# Patient Record
Sex: MALE | Race: OTHER | HISPANIC OR LATINO
[De-identification: names, ages, dates, MRNs, and addresses within clinical notes are randomized per-mention and may not be internally consistent; named-entity substitution may affect disease eponyms.]

---

## 2017-12-29 ENCOUNTER — TRANSCRIPTION ENCOUNTER (OUTPATIENT)
Age: 32
End: 2017-12-29

## 2018-07-05 ENCOUNTER — TRANSCRIPTION ENCOUNTER (OUTPATIENT)
Age: 33
End: 2018-07-05

## 2018-10-22 ENCOUNTER — APPOINTMENT (OUTPATIENT)
Dept: SURGERY | Facility: CLINIC | Age: 33
End: 2018-10-22
Payer: COMMERCIAL

## 2018-10-22 VITALS
HEART RATE: 73 BPM | RESPIRATION RATE: 15 BRPM | BODY MASS INDEX: 45.1 KG/M2 | TEMPERATURE: 98.7 F | WEIGHT: 315 LBS | HEIGHT: 70 IN | SYSTOLIC BLOOD PRESSURE: 122 MMHG | DIASTOLIC BLOOD PRESSURE: 84 MMHG | OXYGEN SATURATION: 97 %

## 2018-10-22 DIAGNOSIS — S91.209A UNSPECIFIED OPEN WOUND OF UNSPECIFIED TOE(S) WITH DAMAGE TO NAIL, INITIAL ENCOUNTER: ICD-10-CM

## 2018-10-22 DIAGNOSIS — Z82.0 FAMILY HISTORY OF EPILEPSY AND OTHER DISEASES OF THE NERVOUS SYSTEM: ICD-10-CM

## 2018-10-22 DIAGNOSIS — K21.9 GASTRO-ESOPHAGEAL REFLUX DISEASE W/OUT ESOPHAGITIS: ICD-10-CM

## 2018-10-22 PROCEDURE — 99205 OFFICE O/P NEW HI 60 MIN: CPT

## 2018-10-22 RX ORDER — OMEPRAZOLE 40 MG/1
40 CAPSULE, DELAYED RELEASE ORAL
Refills: 0 | Status: ACTIVE | COMMUNITY

## 2019-02-21 ENCOUNTER — OUTPATIENT (OUTPATIENT)
Dept: OUTPATIENT SERVICES | Facility: HOSPITAL | Age: 34
LOS: 1 days | Discharge: HOME | End: 2019-02-21
Payer: COMMERCIAL

## 2019-02-21 DIAGNOSIS — K21.9 GASTRO-ESOPHAGEAL REFLUX DISEASE WITHOUT ESOPHAGITIS: ICD-10-CM

## 2019-02-21 LAB
BASOPHILS # BLD AUTO: 0.08 K/UL — SIGNIFICANT CHANGE UP (ref 0–0.2)
BASOPHILS NFR BLD AUTO: 0.8 % — SIGNIFICANT CHANGE UP (ref 0–2)
EOSINOPHIL # BLD AUTO: 0.56 K/UL — HIGH (ref 0–0.5)
EOSINOPHIL NFR BLD AUTO: 5.4 % — SIGNIFICANT CHANGE UP (ref 0–6)
HCT VFR BLD CALC: 47.5 % — SIGNIFICANT CHANGE UP (ref 39–50)
HGB BLD-MCNC: 16 G/DL — SIGNIFICANT CHANGE UP (ref 13–17)
IMM GRANULOCYTES NFR BLD AUTO: 0.3 % — SIGNIFICANT CHANGE UP (ref 0–1.5)
LYMPHOCYTES # BLD AUTO: 2.74 K/UL — SIGNIFICANT CHANGE UP (ref 1–3.3)
LYMPHOCYTES # BLD AUTO: 26.7 % — SIGNIFICANT CHANGE UP (ref 13–44)
MCHC RBC-ENTMCNC: 29.9 PG — SIGNIFICANT CHANGE UP (ref 27–34)
MCHC RBC-ENTMCNC: 33.7 GM/DL — SIGNIFICANT CHANGE UP (ref 32–36)
MCV RBC AUTO: 88.6 FL — SIGNIFICANT CHANGE UP (ref 80–100)
MONOCYTES # BLD AUTO: 0.81 K/UL — SIGNIFICANT CHANGE UP (ref 0–0.9)
MONOCYTES NFR BLD AUTO: 7.9 % — SIGNIFICANT CHANGE UP (ref 2–14)
NEUTROPHILS # BLD AUTO: 6.06 K/UL — SIGNIFICANT CHANGE UP (ref 1.8–7.4)
NEUTROPHILS NFR BLD AUTO: 58.9 % — SIGNIFICANT CHANGE UP (ref 43–77)
NRBC # BLD: 0 /100 WBCS — SIGNIFICANT CHANGE UP (ref 0–0)
PLATELET # BLD AUTO: 320 K/UL — SIGNIFICANT CHANGE UP (ref 150–400)
RBC # BLD: 5.36 M/UL — SIGNIFICANT CHANGE UP (ref 4.2–5.8)
RBC # FLD: 12.6 % — SIGNIFICANT CHANGE UP (ref 10.3–14.5)
WBC # BLD: 10.28 K/UL — SIGNIFICANT CHANGE UP (ref 3.8–10.5)
WBC # FLD AUTO: 10.28 K/UL — SIGNIFICANT CHANGE UP (ref 3.8–10.5)

## 2019-02-21 PROCEDURE — 93010 ELECTROCARDIOGRAM REPORT: CPT

## 2019-02-26 ENCOUNTER — RESULT REVIEW (OUTPATIENT)
Age: 34
End: 2019-02-26

## 2019-02-26 ENCOUNTER — OUTPATIENT (OUTPATIENT)
Dept: OUTPATIENT SERVICES | Facility: HOSPITAL | Age: 34
LOS: 1 days | Discharge: HOME | End: 2019-02-26
Payer: COMMERCIAL

## 2019-02-26 VITALS
HEART RATE: 88 BPM | TEMPERATURE: 97 F | RESPIRATION RATE: 18 BRPM | SYSTOLIC BLOOD PRESSURE: 120 MMHG | HEIGHT: 70 IN | WEIGHT: 315 LBS | DIASTOLIC BLOOD PRESSURE: 72 MMHG | OXYGEN SATURATION: 98 %

## 2019-02-26 DIAGNOSIS — Z94.7 CORNEAL TRANSPLANT STATUS: Chronic | ICD-10-CM

## 2019-02-26 LAB
ANION GAP SERPL CALC-SCNC: 6 MMOL/L — SIGNIFICANT CHANGE UP (ref 5–17)
BUN SERPL-MCNC: 14 MG/DL — SIGNIFICANT CHANGE UP (ref 7–23)
CALCIUM SERPL-MCNC: 8.9 MG/DL — SIGNIFICANT CHANGE UP (ref 8.5–10.1)
CHLORIDE SERPL-SCNC: 104 MMOL/L — SIGNIFICANT CHANGE UP (ref 96–108)
CO2 SERPL-SCNC: 28 MMOL/L — SIGNIFICANT CHANGE UP (ref 22–31)
CREAT SERPL-MCNC: 0.84 MG/DL — SIGNIFICANT CHANGE UP (ref 0.5–1.3)
GLUCOSE SERPL-MCNC: 87 MG/DL — SIGNIFICANT CHANGE UP (ref 70–99)
POTASSIUM SERPL-MCNC: 4 MMOL/L — SIGNIFICANT CHANGE UP (ref 3.5–5.3)
POTASSIUM SERPL-SCNC: 4 MMOL/L — SIGNIFICANT CHANGE UP (ref 3.5–5.3)
SODIUM SERPL-SCNC: 138 MMOL/L — SIGNIFICANT CHANGE UP (ref 135–145)

## 2019-02-26 PROCEDURE — 88312 SPECIAL STAINS GROUP 1: CPT | Mod: 26

## 2019-02-26 PROCEDURE — 88305 TISSUE EXAM BY PATHOLOGIST: CPT | Mod: 26

## 2019-02-26 RX ORDER — SODIUM CHLORIDE 9 MG/ML
1000 INJECTION INTRAMUSCULAR; INTRAVENOUS; SUBCUTANEOUS
Qty: 0 | Refills: 0 | Status: DISCONTINUED | OUTPATIENT
Start: 2019-02-26 | End: 2019-03-13

## 2019-02-27 LAB — SURGICAL PATHOLOGY FINAL REPORT - CH: SIGNIFICANT CHANGE UP

## 2019-02-28 DIAGNOSIS — G47.33 OBSTRUCTIVE SLEEP APNEA (ADULT) (PEDIATRIC): ICD-10-CM

## 2019-02-28 DIAGNOSIS — Z01.818 ENCOUNTER FOR OTHER PREPROCEDURAL EXAMINATION: ICD-10-CM

## 2019-02-28 DIAGNOSIS — K21.9 GASTRO-ESOPHAGEAL REFLUX DISEASE WITHOUT ESOPHAGITIS: ICD-10-CM

## 2019-02-28 DIAGNOSIS — K29.50 UNSPECIFIED CHRONIC GASTRITIS WITHOUT BLEEDING: ICD-10-CM

## 2019-02-28 DIAGNOSIS — E66.01 MORBID (SEVERE) OBESITY DUE TO EXCESS CALORIES: ICD-10-CM

## 2019-02-28 DIAGNOSIS — K44.9 DIAPHRAGMATIC HERNIA WITHOUT OBSTRUCTION OR GANGRENE: ICD-10-CM

## 2019-04-19 DIAGNOSIS — Z00.00 ENCOUNTER FOR GENERAL ADULT MEDICAL EXAMINATION W/OUT ABNORMAL FINDINGS: ICD-10-CM

## 2019-05-17 PROBLEM — Z00.00 ENCOUNTER FOR PREVENTIVE HEALTH EXAMINATION: Status: ACTIVE | Noted: 2018-10-08

## 2019-05-19 DIAGNOSIS — E53.8 DEFICIENCY OF OTHER SPECIFIED B GROUP VITAMINS: ICD-10-CM

## 2019-05-19 DIAGNOSIS — E55.9 VITAMIN D DEFICIENCY, UNSPECIFIED: ICD-10-CM

## 2019-05-24 ENCOUNTER — APPOINTMENT (OUTPATIENT)
Dept: ULTRASOUND IMAGING | Facility: CLINIC | Age: 34
End: 2019-05-24

## 2019-05-28 PROBLEM — E55.9 VITAMIN D DEFICIENCY: Status: ACTIVE | Noted: 2019-05-28

## 2019-05-28 PROBLEM — E53.8 LOW FOLATE: Status: ACTIVE | Noted: 2019-05-28

## 2019-05-28 PROBLEM — E53.8 LOW SERUM VITAMIN B12: Status: ACTIVE | Noted: 2019-05-28

## 2019-06-28 ENCOUNTER — APPOINTMENT (OUTPATIENT)
Dept: SURGERY | Facility: CLINIC | Age: 34
End: 2019-06-28
Payer: COMMERCIAL

## 2019-06-28 ENCOUNTER — OUTPATIENT (OUTPATIENT)
Dept: OUTPATIENT SERVICES | Facility: HOSPITAL | Age: 34
LOS: 1 days | End: 2019-06-28
Payer: COMMERCIAL

## 2019-06-28 VITALS
OXYGEN SATURATION: 95 % | WEIGHT: 315 LBS | RESPIRATION RATE: 18 BRPM | DIASTOLIC BLOOD PRESSURE: 75 MMHG | HEIGHT: 69 IN | SYSTOLIC BLOOD PRESSURE: 110 MMHG | HEART RATE: 94 BPM | TEMPERATURE: 98 F

## 2019-06-28 DIAGNOSIS — Z94.7 CORNEAL TRANSPLANT STATUS: Chronic | ICD-10-CM

## 2019-06-28 DIAGNOSIS — G47.33 OBSTRUCTIVE SLEEP APNEA (ADULT) (PEDIATRIC): ICD-10-CM

## 2019-06-28 DIAGNOSIS — K21.9 GASTRO-ESOPHAGEAL REFLUX DISEASE WITHOUT ESOPHAGITIS: ICD-10-CM

## 2019-06-28 DIAGNOSIS — E55.9 VITAMIN D DEFICIENCY, UNSPECIFIED: ICD-10-CM

## 2019-06-28 DIAGNOSIS — E66.01 MORBID (SEVERE) OBESITY DUE TO EXCESS CALORIES: ICD-10-CM

## 2019-06-28 DIAGNOSIS — Z29.9 ENCOUNTER FOR PROPHYLACTIC MEASURES, UNSPECIFIED: ICD-10-CM

## 2019-06-28 LAB
ALBUMIN SERPL ELPH-MCNC: 4.6 G/DL — SIGNIFICANT CHANGE UP (ref 3.3–5)
ALP SERPL-CCNC: 78 U/L — SIGNIFICANT CHANGE UP (ref 40–120)
ALT FLD-CCNC: 35 U/L — SIGNIFICANT CHANGE UP (ref 10–45)
ANION GAP SERPL CALC-SCNC: 19 MMOL/L — HIGH (ref 5–17)
AST SERPL-CCNC: 33 U/L — SIGNIFICANT CHANGE UP (ref 10–40)
BILIRUB SERPL-MCNC: 0.7 MG/DL — SIGNIFICANT CHANGE UP (ref 0.2–1.2)
BLD GP AB SCN SERPL QL: NEGATIVE — SIGNIFICANT CHANGE UP
BUN SERPL-MCNC: 20 MG/DL — SIGNIFICANT CHANGE UP (ref 7–23)
CALCIUM SERPL-MCNC: 10 MG/DL — SIGNIFICANT CHANGE UP (ref 8.4–10.5)
CHLORIDE SERPL-SCNC: 96 MMOL/L — SIGNIFICANT CHANGE UP (ref 96–108)
CO2 SERPL-SCNC: 21 MMOL/L — LOW (ref 22–31)
CREAT SERPL-MCNC: 0.74 MG/DL — SIGNIFICANT CHANGE UP (ref 0.5–1.3)
GLUCOSE SERPL-MCNC: 88 MG/DL — SIGNIFICANT CHANGE UP (ref 70–99)
HBA1C BLD-MCNC: 5.5 % — SIGNIFICANT CHANGE UP (ref 4–5.6)
HCT VFR BLD CALC: 49.6 % — SIGNIFICANT CHANGE UP (ref 39–50)
HGB BLD-MCNC: 16.8 G/DL — SIGNIFICANT CHANGE UP (ref 13–17)
MCHC RBC-ENTMCNC: 29.1 PG — SIGNIFICANT CHANGE UP (ref 27–34)
MCHC RBC-ENTMCNC: 33.9 GM/DL — SIGNIFICANT CHANGE UP (ref 32–36)
MCV RBC AUTO: 85.8 FL — SIGNIFICANT CHANGE UP (ref 80–100)
PLATELET # BLD AUTO: 325 K/UL — SIGNIFICANT CHANGE UP (ref 150–400)
POTASSIUM SERPL-MCNC: 3.9 MMOL/L — SIGNIFICANT CHANGE UP (ref 3.5–5.3)
POTASSIUM SERPL-SCNC: 3.9 MMOL/L — SIGNIFICANT CHANGE UP (ref 3.5–5.3)
PROT SERPL-MCNC: 7.8 G/DL — SIGNIFICANT CHANGE UP (ref 6–8.3)
RBC # BLD: 5.78 M/UL — SIGNIFICANT CHANGE UP (ref 4.2–5.8)
RBC # FLD: 12.2 % — SIGNIFICANT CHANGE UP (ref 10.3–14.5)
RH IG SCN BLD-IMP: POSITIVE — SIGNIFICANT CHANGE UP
SODIUM SERPL-SCNC: 136 MMOL/L — SIGNIFICANT CHANGE UP (ref 135–145)
WBC # BLD: 11.19 K/UL — HIGH (ref 3.8–10.5)
WBC # FLD AUTO: 11.19 K/UL — HIGH (ref 3.8–10.5)

## 2019-06-28 PROCEDURE — G0463: CPT

## 2019-06-28 PROCEDURE — 80053 COMPREHEN METABOLIC PANEL: CPT

## 2019-06-28 PROCEDURE — 85027 COMPLETE CBC AUTOMATED: CPT

## 2019-06-28 PROCEDURE — 99215 OFFICE O/P EST HI 40 MIN: CPT

## 2019-06-28 PROCEDURE — 86901 BLOOD TYPING SEROLOGIC RH(D): CPT

## 2019-06-28 PROCEDURE — 83036 HEMOGLOBIN GLYCOSYLATED A1C: CPT

## 2019-06-28 PROCEDURE — 86850 RBC ANTIBODY SCREEN: CPT

## 2019-06-28 PROCEDURE — 86900 BLOOD TYPING SEROLOGIC ABO: CPT

## 2019-06-28 RX ORDER — CHOLECALCIFEROL (VITAMIN D3) 125 MCG
1 CAPSULE ORAL
Qty: 0 | Refills: 0 | DISCHARGE

## 2019-06-28 NOTE — H&P PST ADULT - NSICDXPROBLEM_GEN_ALL_CORE_FT
PROBLEM DIAGNOSES  Problem: Sleep apnea, obstructive  Assessment and Plan: Instructed to bring CPAP on the day of surgery.     Problem: Morbid (severe) obesity due to excess calories  Assessment and Plan: surgical intervention 7/9/19    Problem: Vitamin D deficiency  Assessment and Plan: will hold medications prior to surgery     Problem: Acid reflux  Assessment and Plan: will take medication the night before surgery PROBLEM DIAGNOSES  Problem: Need for prophylactic measure  Assessment and Plan: The Caprini score indicates this patient is at risk for a VTE event (score 3-5).  Most surgical patients in this group would benefit from pharmacologic prophylaxis.  The surgical team will determine the balance between VTE risk and bleeding risk      Problem: Sleep apnea, obstructive  Assessment and Plan: Instructed to bring CPAP on the day of surgery.     Problem: Morbid (severe) obesity due to excess calories  Assessment and Plan: laparoscopic vertical sleeve gastrectomy    Problem: Acid reflux  Assessment and Plan: continue PPI PROBLEM DIAGNOSES  Problem: Need for prophylactic measure  Assessment and Plan: The Caprini score indicates this patient is at risk for a VTE event (score 3-5).  Most surgical patients in this group would benefit from pharmacologic prophylaxis.  The surgical team will determine the balance between VTE risk and bleeding risk      Problem: Sleep apnea, obstructive  Assessment and Plan: Instructed to bring CPAP on the day of surgery.   ALYCIA precaution, OR booking was notified    Problem: Morbid (severe) obesity due to excess calories  Assessment and Plan: laparoscopic vertical sleeve gastrectomy    Problem: Acid reflux  Assessment and Plan: continue PPI

## 2019-06-28 NOTE — H&P PST ADULT - HISTORY OF PRESENT ILLNESS
Add Progress Note... This 32 y/o severely obese male presents with a PMH of  Acid Reflux, asleep apnea with CPAP use at night, Vitamin D deficiency and B/L corneal transplant with c/o  "gastric sleeve surgery" due to excess calorie intake. Pt is here for Pre-admission testing for a scheduled Laparoscopic gastrectomy sleeve  7/9/19. Pt denies having any generalized weakness, fever, and or pain at this time. This  is 32 y/o male  with a PMH of  Acid Reflux, asleep apnea with CPAP , Vitamin D deficiency and B/L corneal transplant , morbid obesity,    c/o unable to loose weight by diet and exercise .  pt  presents today for  Laparoscopic gastrectomy sleeve  7/9/19. Pt denies having any generalized weakness, fever, and or pain at this time. This  is 34 y/o male  with a PMH of  Acid Reflux, asleep apnea with CPAP , Vitamin D deficiency and B/L corneal transplant , morbid obesity,    c/o unable to loose weight by diet and exercise .  pt  presents today for  Laparoscopic sleeve gastrectomy

## 2019-06-28 NOTE — H&P PST ADULT - NSICDXFAMILYHX_GEN_ALL_CORE_FT
FAMILY HISTORY:  Known health problems: none, Mother,Father, brother No pertinent family history in first degree relatives FAMILY HISTORY:  No pertinent family history in first degree relatives

## 2019-06-28 NOTE — H&P PST ADULT - ACTIVITY
can climb two flughts of stairs, can perform heavy yard work, can run short distances. can climb two flights of stairs, can perform heavy yard work, can run short distances., can perform light duty work around the house. can climb two flights of stairs, can perform heavy yard work, can run short distances.

## 2019-06-28 NOTE — H&P PST ADULT - ASSESSMENT
Morbid severe obesity Due to Excess calories. Morbid severe obesity Due to Excess calories.   DARSHANI VTE 2.0 SCORE [CLOT updated 2019]    AGE RELATED RISK FACTORS                                                       MOBILITY RELATED FACTORS  [ ] Age 41-60 years                                            (1 Point)                    [ ] Bed rest                                                        (1 Point)  [ ] Age: 61-74 years                                           (2 Points)                  [ ] Plaster cast                                                   (2 Points)  [ ] Age= 75 years                                              (3 Points)                    [ ] Bed bound for more than 72 hours                 (2 Points)    DISEASE RELATED RISK FACTORS                                               GENDER SPECIFIC FACTORS  [ ] Edema in the lower extremities                       (1 Point)              [ ] Pregnancy                                                     (1 Point)  [ ] Varicose veins                                               (1 Point)                     [ ] Post-partum < 6 weeks                                   (1 Point)             [x ] BMI > 25 Kg/m2                                            (1 Point)                     [ ] Hormonal therapy  or oral contraception          (1 Point)                 [ ] Sepsis (in the previous month)                        (1 Point)               [ ] History of pregnancy complications                 (1 point)  [ ] Pneumonia or serious lung disease                                               [ ] Unexplained or recurrent                     (1 Point)           (in the previous month)                               (1 Point)  [ ] Abnormal pulmonary function test                     (1 Point)                 SURGERY RELATED RISK FACTORS  [ ] Acute myocardial infarction                              (1 Point)               [ ]  Section                                             (1 Point)  [ ] Congestive heart failure (in the previous month)  (1 Point)      [ ] Minor surgery                                                  (1 Point)   [x ] Inflammatory bowel disease                             (1 Point)               [ ] Arthroscopic surgery                                        (2 Points)  [ ] Central venous access                                      (2 Points)                [x ] General surgery lasting more than 45 minutes (2 points)  [ ] Malignancy- Present or previous                   (2 Points)                [ ] Elective arthroplasty                                         (5 points)    [ ] Stroke (in the previous month)                          (5 Points)                                                                                                                                                           HEMATOLOGY RELATED FACTORS                                                 TRAUMA RELATED RISK FACTORS  [ ] Prior episodes of VTE                                     (3 Points)                [ ] Fracture of the hip, pelvis, or leg                       (5 Points)  [ ] Positive family history for VTE                         (3 Points)             [ ] Acute spinal cord injury (in the previous month)  (5 Points)  [ ] Prothrombin 35095 A                                     (3 Points)               [ ] Paralysis  (less than 1 month)                             (5 Points)  [ ] Factor V Leiden                                             (3 Points)                  [ ] Multiple Trauma within 1 month                        (5 Points)  [ ] Lupus anticoagulants                                     (3 Points)                                                           [ ] Anticardiolipin antibodies                               (3 Points)                                                       [ ] High homocysteine in the blood                      (3 Points)                                             [ ] Other congenital or acquired thrombophilia      (3 Points)                                                [ ] Heparin induced thrombocytopenia                  (3 Points)                                     Total Score [      4    ]

## 2019-06-28 NOTE — H&P PST ADULT - NSICDXPASTMEDICALHX_GEN_ALL_CORE_FT
PAST MEDICAL HISTORY:  Acid reflux on medication, dieat controlled    Vitamin D deficiency on medication PAST MEDICAL HISTORY:  Acid reflux on medication, dieat controlled    Sleep apnea, obstructive Currently on CPAP at night    Vitamin D deficiency on medication PAST MEDICAL HISTORY:  Acid reflux on medication, diet controlled    Sleep apnea, obstructive Currently on CPAP at night    Vitamin D deficiency on medication PAST MEDICAL HISTORY:  Acid reflux     Morbid obesity     Sleep apnea, obstructive with CPAP    Vitamin D deficiency on medication

## 2019-07-08 ENCOUNTER — TRANSCRIPTION ENCOUNTER (OUTPATIENT)
Age: 34
End: 2019-07-08

## 2019-07-08 NOTE — PHARMACOTHERAPY INTERVENTION NOTE - COMMENTS
Vertical sleeve gastrectomy scheduled for 7/9/2019.  Patient medication reconciliation completed. Patient currently taking:     Omeprazole 40mg DR cap by mouth daily (~ 2 yrs for acid reflux)  Folic acid 0.4mg by mouth daily  Vitamin B12 1000mcg by mouth daily  Vitamin D2 50,000units by mouth weekly  Vitamin D3 5000units by mouth daily  Allegra as needed  Tylenol as needed    Patient was instructed to use crushed, dissolvable, chewable, or liquid formulations of medications for 1 month. Patient was informed to take daily multivitamins post surgically. Patient reeducated on NSAID avoidance (ibuprofen, ASA, naproxen, aleve) as they increased risk of GI bleeding; may use APAP for mild pain otherwise contact prescriber for consult. Patient was informed on indications and directions for administration for hyoscyamine SL, acetaminophen liquid, ondansetron ODT, and omeprazole DR. Patient was instructed to take the medications as follows:     Open omeprazole cap and mix with apple sauce  Continue vitamins/supplements in chewable/dissolvable forms   Hold weekly vitamin D x 1 week, continue daily 5000units.    Wilbur Beck, PharmD  [phone]

## 2019-07-09 ENCOUNTER — INPATIENT (INPATIENT)
Facility: HOSPITAL | Age: 34
LOS: 0 days | Discharge: HOME | DRG: 621 | End: 2019-07-10
Attending: SURGERY | Admitting: SURGERY
Payer: COMMERCIAL

## 2019-07-09 ENCOUNTER — APPOINTMENT (OUTPATIENT)
Dept: SURGERY | Facility: HOSPITAL | Age: 34
End: 2019-07-09
Payer: COMMERCIAL

## 2019-07-09 ENCOUNTER — RESULT REVIEW (OUTPATIENT)
Age: 34
End: 2019-07-09

## 2019-07-09 VITALS
HEART RATE: 88 BPM | DIASTOLIC BLOOD PRESSURE: 80 MMHG | SYSTOLIC BLOOD PRESSURE: 122 MMHG | WEIGHT: 315 LBS | RESPIRATION RATE: 18 BRPM | OXYGEN SATURATION: 96 % | HEIGHT: 69 IN | TEMPERATURE: 98 F

## 2019-07-09 DIAGNOSIS — E66.01 MORBID (SEVERE) OBESITY DUE TO EXCESS CALORIES: ICD-10-CM

## 2019-07-09 DIAGNOSIS — Z94.7 CORNEAL TRANSPLANT STATUS: Chronic | ICD-10-CM

## 2019-07-09 LAB
ANION GAP SERPL CALC-SCNC: 19 MMOL/L — HIGH (ref 5–17)
BASOPHILS # BLD AUTO: 0 K/UL — SIGNIFICANT CHANGE UP (ref 0–0.2)
BASOPHILS NFR BLD AUTO: 0.1 % — SIGNIFICANT CHANGE UP (ref 0–2)
BUN SERPL-MCNC: 12 MG/DL — SIGNIFICANT CHANGE UP (ref 7–23)
CALCIUM SERPL-MCNC: 9.7 MG/DL — SIGNIFICANT CHANGE UP (ref 8.4–10.5)
CHLORIDE SERPL-SCNC: 95 MMOL/L — LOW (ref 96–108)
CO2 SERPL-SCNC: 21 MMOL/L — LOW (ref 22–31)
CREAT SERPL-MCNC: 0.96 MG/DL — SIGNIFICANT CHANGE UP (ref 0.5–1.3)
EOSINOPHIL # BLD AUTO: 0.1 K/UL — SIGNIFICANT CHANGE UP (ref 0–0.5)
EOSINOPHIL NFR BLD AUTO: 0.3 % — SIGNIFICANT CHANGE UP (ref 0–6)
GLUCOSE BLDC GLUCOMTR-MCNC: 77 MG/DL — SIGNIFICANT CHANGE UP (ref 70–99)
GLUCOSE SERPL-MCNC: 101 MG/DL — HIGH (ref 70–99)
HCT VFR BLD CALC: 48.9 % — SIGNIFICANT CHANGE UP (ref 39–50)
HGB BLD-MCNC: 16.9 G/DL — SIGNIFICANT CHANGE UP (ref 13–17)
LYMPHOCYTES # BLD AUTO: 1.6 K/UL — SIGNIFICANT CHANGE UP (ref 1–3.3)
LYMPHOCYTES # BLD AUTO: 7.5 % — LOW (ref 13–44)
MCHC RBC-ENTMCNC: 30.5 PG — SIGNIFICANT CHANGE UP (ref 27–34)
MCHC RBC-ENTMCNC: 34.6 GM/DL — SIGNIFICANT CHANGE UP (ref 32–36)
MCV RBC AUTO: 88.1 FL — SIGNIFICANT CHANGE UP (ref 80–100)
MONOCYTES # BLD AUTO: 0.8 K/UL — SIGNIFICANT CHANGE UP (ref 0–0.9)
MONOCYTES NFR BLD AUTO: 3.7 % — SIGNIFICANT CHANGE UP (ref 2–14)
NEUTROPHILS # BLD AUTO: 18.7 K/UL — HIGH (ref 1.8–7.4)
NEUTROPHILS NFR BLD AUTO: 88.4 % — HIGH (ref 43–77)
PLATELET # BLD AUTO: 361 K/UL — SIGNIFICANT CHANGE UP (ref 150–400)
POTASSIUM SERPL-MCNC: 4.3 MMOL/L — SIGNIFICANT CHANGE UP (ref 3.5–5.3)
POTASSIUM SERPL-SCNC: 4.3 MMOL/L — SIGNIFICANT CHANGE UP (ref 3.5–5.3)
RBC # BLD: 5.55 M/UL — SIGNIFICANT CHANGE UP (ref 4.2–5.8)
RBC # FLD: 11.7 % — SIGNIFICANT CHANGE UP (ref 10.3–14.5)
RH IG SCN BLD-IMP: POSITIVE — SIGNIFICANT CHANGE UP
SODIUM SERPL-SCNC: 135 MMOL/L — SIGNIFICANT CHANGE UP (ref 135–145)
WBC # BLD: 21.2 K/UL — HIGH (ref 3.8–10.5)
WBC # FLD AUTO: 21.2 K/UL — HIGH (ref 3.8–10.5)

## 2019-07-09 PROCEDURE — 43281 LAP PARAESOPHAG HERN REPAIR: CPT | Mod: 59

## 2019-07-09 PROCEDURE — 43775 LAP SLEEVE GASTRECTOMY: CPT

## 2019-07-09 PROCEDURE — 88305 TISSUE EXAM BY PATHOLOGIST: CPT | Mod: 26

## 2019-07-09 RX ORDER — HYDROMORPHONE HYDROCHLORIDE 2 MG/ML
0.25 INJECTION INTRAMUSCULAR; INTRAVENOUS; SUBCUTANEOUS
Refills: 0 | Status: DISCONTINUED | OUTPATIENT
Start: 2019-07-09 | End: 2019-07-09

## 2019-07-09 RX ORDER — CEFAZOLIN SODIUM 1 G
3000 VIAL (EA) INJECTION EVERY 8 HOURS
Refills: 0 | Status: COMPLETED | OUTPATIENT
Start: 2019-07-09 | End: 2019-07-10

## 2019-07-09 RX ORDER — KETOROLAC TROMETHAMINE 30 MG/ML
30 SYRINGE (ML) INJECTION EVERY 6 HOURS
Refills: 0 | Status: DISCONTINUED | OUTPATIENT
Start: 2019-07-09 | End: 2019-07-10

## 2019-07-09 RX ORDER — ONDANSETRON 8 MG/1
4 TABLET, FILM COATED ORAL ONCE
Refills: 0 | Status: COMPLETED | OUTPATIENT
Start: 2019-07-09 | End: 2019-07-09

## 2019-07-09 RX ORDER — SODIUM CHLORIDE 9 MG/ML
3 INJECTION INTRAMUSCULAR; INTRAVENOUS; SUBCUTANEOUS EVERY 8 HOURS
Refills: 0 | Status: DISCONTINUED | OUTPATIENT
Start: 2019-07-09 | End: 2019-07-09

## 2019-07-09 RX ORDER — ACETAMINOPHEN 500 MG
1000 TABLET ORAL ONCE
Refills: 0 | Status: COMPLETED | OUTPATIENT
Start: 2019-07-09 | End: 2019-07-09

## 2019-07-09 RX ORDER — OXYCODONE HYDROCHLORIDE 5 MG/1
5 TABLET ORAL EVERY 4 HOURS
Refills: 0 | Status: DISCONTINUED | OUTPATIENT
Start: 2019-07-09 | End: 2019-07-10

## 2019-07-09 RX ORDER — LIDOCAINE HCL 20 MG/ML
0.2 VIAL (ML) INJECTION ONCE
Refills: 0 | Status: DISCONTINUED | OUTPATIENT
Start: 2019-07-09 | End: 2019-07-09

## 2019-07-09 RX ORDER — KETOROLAC TROMETHAMINE 30 MG/ML
30 SYRINGE (ML) INJECTION EVERY 6 HOURS
Refills: 0 | Status: DISCONTINUED | OUTPATIENT
Start: 2019-07-09 | End: 2019-07-09

## 2019-07-09 RX ORDER — ONDANSETRON 8 MG/1
4 TABLET, FILM COATED ORAL EVERY 6 HOURS
Refills: 0 | Status: DISCONTINUED | OUTPATIENT
Start: 2019-07-09 | End: 2019-07-10

## 2019-07-09 RX ORDER — CEFAZOLIN SODIUM 1 G
3000 VIAL (EA) INJECTION ONCE
Refills: 0 | Status: COMPLETED | OUTPATIENT
Start: 2019-07-09 | End: 2019-07-09

## 2019-07-09 RX ORDER — FOLIC ACID 0.8 MG
1 TABLET ORAL
Qty: 0 | Refills: 0 | DISCHARGE

## 2019-07-09 RX ORDER — CHLORHEXIDINE GLUCONATE 213 G/1000ML
1 SOLUTION TOPICAL ONCE
Refills: 0 | Status: DISCONTINUED | OUTPATIENT
Start: 2019-07-09 | End: 2019-07-09

## 2019-07-09 RX ORDER — PANTOPRAZOLE SODIUM 20 MG/1
40 TABLET, DELAYED RELEASE ORAL EVERY 24 HOURS
Refills: 0 | Status: DISCONTINUED | OUTPATIENT
Start: 2019-07-09 | End: 2019-07-10

## 2019-07-09 RX ORDER — KETOROLAC TROMETHAMINE 30 MG/ML
30 SYRINGE (ML) INJECTION ONCE
Refills: 0 | Status: DISCONTINUED | OUTPATIENT
Start: 2019-07-09 | End: 2019-07-09

## 2019-07-09 RX ORDER — SODIUM CHLORIDE 9 MG/ML
1000 INJECTION, SOLUTION INTRAVENOUS
Refills: 0 | Status: DISCONTINUED | OUTPATIENT
Start: 2019-07-09 | End: 2019-07-10

## 2019-07-09 RX ORDER — HEPARIN SODIUM 5000 [USP'U]/ML
7500 INJECTION INTRAVENOUS; SUBCUTANEOUS EVERY 8 HOURS
Refills: 0 | Status: DISCONTINUED | OUTPATIENT
Start: 2019-07-09 | End: 2019-07-10

## 2019-07-09 RX ORDER — HEPARIN SODIUM 5000 [USP'U]/ML
7500 INJECTION INTRAVENOUS; SUBCUTANEOUS ONCE
Refills: 0 | Status: COMPLETED | OUTPATIENT
Start: 2019-07-09 | End: 2019-07-09

## 2019-07-09 RX ORDER — CHOLECALCIFEROL (VITAMIN D3) 125 MCG
1 CAPSULE ORAL
Qty: 0 | Refills: 0 | DISCHARGE

## 2019-07-09 RX ORDER — HYOSCYAMINE SULFATE 0.13 MG
0.12 TABLET ORAL EVERY 6 HOURS
Refills: 0 | Status: DISCONTINUED | OUTPATIENT
Start: 2019-07-09 | End: 2019-07-10

## 2019-07-09 RX ORDER — ACETAMINOPHEN 500 MG
1000 TABLET ORAL ONCE
Refills: 0 | Status: DISCONTINUED | OUTPATIENT
Start: 2019-07-09 | End: 2019-07-09

## 2019-07-09 RX ORDER — ACETAMINOPHEN 500 MG
1000 TABLET ORAL ONCE
Refills: 0 | Status: COMPLETED | OUTPATIENT
Start: 2019-07-10 | End: 2019-07-10

## 2019-07-09 RX ORDER — PREGABALIN 225 MG/1
1 CAPSULE ORAL
Qty: 0 | Refills: 0 | DISCHARGE

## 2019-07-09 RX ADMIN — Medication 1000 MILLIGRAM(S): at 17:53

## 2019-07-09 RX ADMIN — Medication 0.12 MILLIGRAM(S): at 11:33

## 2019-07-09 RX ADMIN — Medication 0.12 MILLIGRAM(S): at 23:36

## 2019-07-09 RX ADMIN — HEPARIN SODIUM 7500 UNIT(S): 5000 INJECTION INTRAVENOUS; SUBCUTANEOUS at 20:02

## 2019-07-09 RX ADMIN — Medication 200 MILLIGRAM(S): at 19:54

## 2019-07-09 RX ADMIN — Medication 30 MILLIGRAM(S): at 11:30

## 2019-07-09 RX ADMIN — Medication 400 MILLIGRAM(S): at 17:47

## 2019-07-09 RX ADMIN — Medication 30 MILLIGRAM(S): at 11:45

## 2019-07-09 RX ADMIN — ONDANSETRON 4 MILLIGRAM(S): 8 TABLET, FILM COATED ORAL at 11:32

## 2019-07-09 RX ADMIN — Medication 30 MILLIGRAM(S): at 23:36

## 2019-07-09 RX ADMIN — Medication 30 MILLIGRAM(S): at 17:44

## 2019-07-09 RX ADMIN — HEPARIN SODIUM 7500 UNIT(S): 5000 INJECTION INTRAVENOUS; SUBCUTANEOUS at 07:02

## 2019-07-09 RX ADMIN — Medication 30 MILLIGRAM(S): at 17:54

## 2019-07-09 RX ADMIN — SODIUM CHLORIDE 250 MILLILITER(S): 9 INJECTION, SOLUTION INTRAVENOUS at 12:01

## 2019-07-09 RX ADMIN — ONDANSETRON 4 MILLIGRAM(S): 8 TABLET, FILM COATED ORAL at 23:36

## 2019-07-09 RX ADMIN — Medication 400 MILLIGRAM(S): at 23:35

## 2019-07-09 RX ADMIN — Medication 0.12 MILLIGRAM(S): at 17:46

## 2019-07-09 RX ADMIN — ONDANSETRON 4 MILLIGRAM(S): 8 TABLET, FILM COATED ORAL at 17:54

## 2019-07-09 RX ADMIN — PANTOPRAZOLE SODIUM 40 MILLIGRAM(S): 20 TABLET, DELAYED RELEASE ORAL at 11:32

## 2019-07-09 RX ADMIN — ONDANSETRON 4 MILLIGRAM(S): 8 TABLET, FILM COATED ORAL at 12:41

## 2019-07-09 RX ADMIN — SODIUM CHLORIDE 150 MILLILITER(S): 9 INJECTION, SOLUTION INTRAVENOUS at 17:53

## 2019-07-09 NOTE — BRIEF OPERATIVE NOTE - NSICDXBRIEFPROCEDURE_GEN_ALL_CORE_FT
PROCEDURES:  Gastrectomy, sleeve, laparoscopic, with laparoscopic hiatal hernia repair 09-Jul-2019 11:13:28  Guy Ding

## 2019-07-09 NOTE — CHART NOTE - NSCHARTNOTEFT_GEN_A_CORE
S: Patient doing well, denies fevers, chills, nausea, emesis, chest pain, SOB.  Says he is experiencing some epigastric pain, but is manageable.  He has ambulated.  He has not yet voided nor eaten or drank anything.    O: Vital Signs  T(C): 36.4 (07-09 @ 14:00), Max: 36.8 (07-09 @ 06:51)  HR: 87 (07-09 @ 14:00) (81 - 89)  BP: 122/58 (07-09 @ 14:00) (105/59 - 153/90)  RR: 14 (07-09 @ 14:00) (12 - 18)  SpO2: 98% (07-09 @ 14:00) (94% - 100%)  07-09-19 @ 07:01  -  07-09-19 @ 14:21  --------------------------------------------------------  IN: 650 mL / OUT: 0 mL / NET: 650 mL      General: alert and oriented, NAD  Resp: airway patent, respirations unlabored  CVS: regular rate and rhythm  Abdomen: soft, nontender, nondistended.  Lap site closures clear dry and intact.  Extremities: no edema  Skin: warm, dry, appropriate color                          16.9   21.2  )-----------( 361      ( 09 Jul 2019 11:51 )             48.9   07-09    135  |  95<L>  |  12  ----------------------------<  101<H>  4.3   |  21<L>  |  0.96    Ca    9.7      09 Jul 2019 11:51    Assessment:  Mr. Swartz is a 33 yom with PMH of GERD, Morbid obesity and ALYCIA on CPAP who is now s/p laparoscopic sleeve gastrectomy with hiatal hernia repair.  He is doing well post-operatively without significant pain.    Plan:    Pain control with tylenol, tordol, and oxycodone  Start clears six hours post-operatively  Awaiting return of voiding function

## 2019-07-09 NOTE — PRE-ANESTHESIA EVALUATION ADULT - NSANTHADDINFOFT_GEN_ALL_CORE
ASA 3: class 3 obesity with GERD, ALYCIA, no other sequelae, here for bariatric surgery. Airway exam predicts adequate airway for intubation.

## 2019-07-10 ENCOUNTER — TRANSCRIPTION ENCOUNTER (OUTPATIENT)
Age: 34
End: 2019-07-10

## 2019-07-10 VITALS
DIASTOLIC BLOOD PRESSURE: 76 MMHG | HEART RATE: 74 BPM | OXYGEN SATURATION: 97 % | SYSTOLIC BLOOD PRESSURE: 116 MMHG | RESPIRATION RATE: 18 BRPM | TEMPERATURE: 98 F

## 2019-07-10 PROBLEM — G47.33 OBSTRUCTIVE SLEEP APNEA (ADULT) (PEDIATRIC): Chronic | Status: ACTIVE | Noted: 2019-06-28

## 2019-07-10 PROBLEM — E66.01 MORBID (SEVERE) OBESITY DUE TO EXCESS CALORIES: Chronic | Status: ACTIVE | Noted: 2019-06-28

## 2019-07-10 PROBLEM — K21.9 GASTRO-ESOPHAGEAL REFLUX DISEASE WITHOUT ESOPHAGITIS: Chronic | Status: ACTIVE | Noted: 2019-06-28

## 2019-07-10 PROBLEM — E55.9 VITAMIN D DEFICIENCY, UNSPECIFIED: Chronic | Status: ACTIVE | Noted: 2019-06-28

## 2019-07-10 LAB
ANION GAP SERPL CALC-SCNC: 16 MMOL/L — SIGNIFICANT CHANGE UP (ref 5–17)
BASOPHILS # BLD AUTO: 0 K/UL — SIGNIFICANT CHANGE UP (ref 0–0.2)
BASOPHILS NFR BLD AUTO: 0.3 % — SIGNIFICANT CHANGE UP (ref 0–2)
BUN SERPL-MCNC: 13 MG/DL — SIGNIFICANT CHANGE UP (ref 7–23)
CALCIUM SERPL-MCNC: 8.5 MG/DL — SIGNIFICANT CHANGE UP (ref 8.4–10.5)
CHLORIDE SERPL-SCNC: 99 MMOL/L — SIGNIFICANT CHANGE UP (ref 96–108)
CO2 SERPL-SCNC: 22 MMOL/L — SIGNIFICANT CHANGE UP (ref 22–31)
CREAT SERPL-MCNC: 0.79 MG/DL — SIGNIFICANT CHANGE UP (ref 0.5–1.3)
EOSINOPHIL # BLD AUTO: 0 K/UL — SIGNIFICANT CHANGE UP (ref 0–0.5)
EOSINOPHIL NFR BLD AUTO: 0.2 % — SIGNIFICANT CHANGE UP (ref 0–6)
GLUCOSE SERPL-MCNC: 90 MG/DL — SIGNIFICANT CHANGE UP (ref 70–99)
HCT VFR BLD CALC: 42.3 % — SIGNIFICANT CHANGE UP (ref 39–50)
HGB BLD-MCNC: 14.1 G/DL — SIGNIFICANT CHANGE UP (ref 13–17)
LYMPHOCYTES # BLD AUTO: 16.2 % — SIGNIFICANT CHANGE UP (ref 13–44)
LYMPHOCYTES # BLD AUTO: 2.4 K/UL — SIGNIFICANT CHANGE UP (ref 1–3.3)
MCHC RBC-ENTMCNC: 29.4 PG — SIGNIFICANT CHANGE UP (ref 27–34)
MCHC RBC-ENTMCNC: 33.4 GM/DL — SIGNIFICANT CHANGE UP (ref 32–36)
MCV RBC AUTO: 88 FL — SIGNIFICANT CHANGE UP (ref 80–100)
MONOCYTES # BLD AUTO: 1.8 K/UL — HIGH (ref 0–0.9)
MONOCYTES NFR BLD AUTO: 12.1 % — SIGNIFICANT CHANGE UP (ref 2–14)
NEUTROPHILS # BLD AUTO: 10.5 K/UL — HIGH (ref 1.8–7.4)
NEUTROPHILS NFR BLD AUTO: 71.1 % — SIGNIFICANT CHANGE UP (ref 43–77)
PLATELET # BLD AUTO: 337 K/UL — SIGNIFICANT CHANGE UP (ref 150–400)
POTASSIUM SERPL-MCNC: 3.9 MMOL/L — SIGNIFICANT CHANGE UP (ref 3.5–5.3)
POTASSIUM SERPL-SCNC: 3.9 MMOL/L — SIGNIFICANT CHANGE UP (ref 3.5–5.3)
RBC # BLD: 4.8 M/UL — SIGNIFICANT CHANGE UP (ref 4.2–5.8)
RBC # FLD: 11.6 % — SIGNIFICANT CHANGE UP (ref 10.3–14.5)
SODIUM SERPL-SCNC: 137 MMOL/L — SIGNIFICANT CHANGE UP (ref 135–145)
WBC # BLD: 14.7 K/UL — HIGH (ref 3.8–10.5)
WBC # FLD AUTO: 14.7 K/UL — HIGH (ref 3.8–10.5)

## 2019-07-10 PROCEDURE — 82962 GLUCOSE BLOOD TEST: CPT

## 2019-07-10 PROCEDURE — 86900 BLOOD TYPING SEROLOGIC ABO: CPT

## 2019-07-10 PROCEDURE — 86901 BLOOD TYPING SEROLOGIC RH(D): CPT

## 2019-07-10 PROCEDURE — 80048 BASIC METABOLIC PNL TOTAL CA: CPT

## 2019-07-10 PROCEDURE — 88305 TISSUE EXAM BY PATHOLOGIST: CPT

## 2019-07-10 PROCEDURE — 85027 COMPLETE CBC AUTOMATED: CPT

## 2019-07-10 PROCEDURE — C1889: CPT

## 2019-07-10 RX ORDER — ERGOCALCIFEROL 1.25 MG/1
1.25 CAPSULE ORAL
Qty: 0 | Refills: 0 | DISCHARGE

## 2019-07-10 RX ORDER — OXYCODONE HYDROCHLORIDE 5 MG/1
5 TABLET ORAL
Qty: 200 | Refills: 0
Start: 2019-07-10 | End: 2019-07-16

## 2019-07-10 RX ORDER — HYOSCYAMINE SULFATE 0.13 MG
1 TABLET ORAL
Qty: 15 | Refills: 0
Start: 2019-07-10 | End: 2019-07-16

## 2019-07-10 RX ORDER — ONDANSETRON 8 MG/1
1 TABLET, FILM COATED ORAL
Qty: 21 | Refills: 0
Start: 2019-07-10 | End: 2019-07-16

## 2019-07-10 RX ORDER — OMEPRAZOLE 10 MG/1
1 CAPSULE, DELAYED RELEASE ORAL
Qty: 0 | Refills: 0 | DISCHARGE

## 2019-07-10 RX ORDER — ACETAMINOPHEN 500 MG
500 TABLET ORAL
Qty: 0 | Refills: 0 | DISCHARGE

## 2019-07-10 RX ORDER — ACETAMINOPHEN 500 MG
650 TABLET ORAL EVERY 6 HOURS
Refills: 0 | Status: DISCONTINUED | OUTPATIENT
Start: 2019-07-10 | End: 2019-07-10

## 2019-07-10 RX ORDER — HYOSCYAMINE SULFATE 0.13 MG
0 TABLET ORAL
Qty: 0 | Refills: 0 | DISCHARGE
Start: 2019-07-10

## 2019-07-10 RX ORDER — OMEPRAZOLE 10 MG/1
1 CAPSULE, DELAYED RELEASE ORAL
Qty: 30 | Refills: 0
Start: 2019-07-10 | End: 2019-08-08

## 2019-07-10 RX ADMIN — ONDANSETRON 4 MILLIGRAM(S): 8 TABLET, FILM COATED ORAL at 11:50

## 2019-07-10 RX ADMIN — Medication 1000 MILLIGRAM(S): at 00:06

## 2019-07-10 RX ADMIN — PANTOPRAZOLE SODIUM 40 MILLIGRAM(S): 20 TABLET, DELAYED RELEASE ORAL at 11:47

## 2019-07-10 RX ADMIN — Medication 30 MILLIGRAM(S): at 13:10

## 2019-07-10 RX ADMIN — Medication 30 MILLIGRAM(S): at 06:57

## 2019-07-10 RX ADMIN — Medication 30 MILLIGRAM(S): at 11:49

## 2019-07-10 RX ADMIN — SODIUM CHLORIDE 150 MILLILITER(S): 9 INJECTION, SOLUTION INTRAVENOUS at 11:52

## 2019-07-10 RX ADMIN — Medication 30 MILLIGRAM(S): at 06:27

## 2019-07-10 RX ADMIN — Medication 0.12 MILLIGRAM(S): at 11:51

## 2019-07-10 RX ADMIN — HEPARIN SODIUM 7500 UNIT(S): 5000 INJECTION INTRAVENOUS; SUBCUTANEOUS at 11:51

## 2019-07-10 RX ADMIN — Medication 200 MILLIGRAM(S): at 04:04

## 2019-07-10 RX ADMIN — HEPARIN SODIUM 7500 UNIT(S): 5000 INJECTION INTRAVENOUS; SUBCUTANEOUS at 04:05

## 2019-07-10 RX ADMIN — Medication 30 MILLIGRAM(S): at 00:06

## 2019-07-10 RX ADMIN — Medication 1000 MILLIGRAM(S): at 06:57

## 2019-07-10 RX ADMIN — Medication 650 MILLIGRAM(S): at 15:23

## 2019-07-10 RX ADMIN — Medication 0.12 MILLIGRAM(S): at 06:27

## 2019-07-10 RX ADMIN — Medication 650 MILLIGRAM(S): at 14:17

## 2019-07-10 RX ADMIN — ONDANSETRON 4 MILLIGRAM(S): 8 TABLET, FILM COATED ORAL at 06:27

## 2019-07-10 RX ADMIN — Medication 400 MILLIGRAM(S): at 06:27

## 2019-07-10 NOTE — DISCHARGE NOTE NURSING/CASE MANAGEMENT/SOCIAL WORK - NSDCDPATPORTLINK_GEN_ALL_CORE
You can access the [facility] Patient Portal, offered by [facility], by registering with the following website: [email or web address]

## 2019-07-10 NOTE — DISCHARGE NOTE PROVIDER - NSDCFUADDINST_GEN_ALL_CORE_FT
Additional instructions as explained and outlined the gastric sleeve instructions. No exercise, no heavy lifting, call office for shortness for breath chest pain, calf pain and selling, increase pain and drainage from abdominal incision sites. Bariatric full diet as described above.  Do not take any whole large pills.  Please crush medications, open granules or take suspensions.

## 2019-07-10 NOTE — DISCHARGE NOTE PROVIDER - CARE PROVIDER_API CALL
Conor Hayden)  Surgery  [address], [address]  [place] [address]  Phone: [phone]  Fax: [phone]  Follow Up Time: 1 week

## 2019-07-10 NOTE — PHARMACOTHERAPY INTERVENTION NOTE - COMMENTS
Spoke to patient about absorption issues with medications and the need to crush tablets or open capsules for the next 30 days. Reinforced discussion points from previous counseling. Informed patient of new prescriptions sent to pharmacy.    Santa Castelan, PharmD  PGY2 Pharmacotherapy Resident  Pager: [phone]

## 2019-07-10 NOTE — DISCHARGE NOTE PROVIDER - INSTRUCTIONS
Bariatric phase 1 full liquid diet starting tomorrow for a period of 2 weeks.  Drink a minimum of 1000ml of water daily. No carbonated beverage, no sweetened drinks.

## 2019-07-10 NOTE — DISCHARGE NOTE PROVIDER - HOSPITAL COURSE
On 7/9/19 underwent Laparoscopic Sleeve Gastrectomy for morbid obesity, BMI 48.8.  They received VTE and SSI prophylaxis prior to start of procedure and throughout the hospital course as indicated.  Procedure went as planned, extubated in the OR and patient subsequently taken to the recovery room. Postoperatively their pain was managed with opioid and non-opioid analgesia.  Once hemodynamically stable they ambulated with assistance and was later transferred to the bariatric unit and placed on bedside continuous pulse oximetry. They were able to void without difficulty same day of surgery.  They began Bariatric clear liquid diet evening of surgery.    	    On POD#1 they remained hemodynamically stable and tolerating increase bariatric liquid diet. They were ambulating independently, comfortable and had effective pain control. The rest of their hospital course was uneventful and they were cleared for discharge.  Procedure specific written discharge instructions explained, written materials given to include signs and symptoms of postop complications and VTE prevention.  They was instructed to follow a protocol-derived staged meal progression supervised by their dietician. They will follow up with Dr Hayden in 7-10 days with subsequent visits at one, three and six months, then annually. They were also instructed to follow up with their dietician and PMD in 30 days. [place] score calculated to 0.52% and they will not require any pharmacologic VTE prophylaxis at home. On 7/9/19 underwent Laparoscopic Sleeve Gastrectomy and hiatal hernia repair for morbid obesity, BMI 48.8.  They received VTE and SSI prophylaxis prior to start of procedure and throughout the hospital course as indicated.  Procedure went as planned, extubated in the OR and patient subsequently taken to the recovery room. Postoperatively their pain was managed with opioid and non-opioid analgesia.  Once hemodynamically stable they ambulated with assistance and was later transferred to the bariatric unit and placed on bedside continuous pulse oximetry. They were able to void without difficulty same day of surgery.  They began Bariatric clear liquid diet evening of surgery.    	    On POD#1 they remained hemodynamically stable and tolerating increase bariatric liquid diet. They were ambulating independently, comfortable and had effective pain control. The rest of their hospital course was uneventful and they were cleared for discharge.  Procedure specific written discharge instructions explained, written materials given to include signs and symptoms of postop complications and VTE prevention.  They was instructed to follow a protocol-derived staged meal progression supervised by their dietician. They will follow up with Dr Hayden in 7-10 days with subsequent visits at one, three and six months, then annually. They were also instructed to follow up with their dietician and PMD in 30 days. [place] score calculated to 0.52% and they will not require any pharmacologic VTE prophylaxis at home.

## 2019-07-10 NOTE — DIETITIAN INITIAL EVALUATION ADULT. - ADD RECOMMEND
1. Laparoscopic vertical sleeve gastrectomy recommendations reviewed/reinforced (discharge instruction handout references). . Discussed vitamin/mineral supplement compliance Pt encouraged to follow-up with outpatient RD. 2.RD to remain available to reinforce nutrition education as requested by patient

## 2019-07-10 NOTE — PROGRESS NOTE ADULT - ASSESSMENT
Assessment and Plan:  33y y/o Male s/p Gastrectomy, sleeve, laparoscopic, with laparoscopic hiatal hernia repair  Gastrectomy, sleeve  , POD 1    - Start PO liquid oxycodone PRN for pain  - Continue ambulation   - Encourage Incentive Spirometer use  - Bariatric clears   - Continue chemical and mechanical DVT prophylaxis  - Discharge home once tolerating adequate PO and 8 point discharge criteria met    Discuss with attending

## 2019-07-10 NOTE — DIETITIAN INITIAL EVALUATION ADULT. - OTHER INFO
Pt reports following a full liquid diet for 2 weeks PTA as instructed by outpatient RD. Pt reports having Premier protein. Confirms NKFA. Takes vitamin D2 + D3, folic acid.       Pt seen for bariatric surgery consult on 2MON. Pt with multiple previous wt loss attempts per chart. Pt tried the following: diet, exercise and was unable to lose and maintain significant wt loss. Per chart, pt met with outpatient RD and weighed  380 pounds (~6/19/2019). Pre-surgical wt (H&P) noted as 339.9 pounds (6/28/2019). Current wt of 329.7 pounds (7/9/2019). Pt now S/P laparoscopic vertical sleeve gastrectomy. Pt denies N+V, sipping on bariatric clear liquids during RD visit. Pt with knowledge of bariatric full liquid diet and receptive to in depth review/reinforcement. Pt reports home stock of protein shakes with plans to purchase more. Pt reports purchasing the necessary vitamins/minerals in chewable/liquid/crushable form including a multivitamin with added elemental iron, calcium citrate with vitamin D, and sublingual B12. Pt was advised to take the multivitamin with elemental iron at least 2 hours apart from the calcium citrate with vitamin D for optimal absorption. Pt plans to schedule a follow up appointment with outpatient RD. Pt able to teach back all points discussed during interview.      Diet Education:   1) Laparoscopic vertical sleeve gastrectomy recommendations reviewed/reinforced (discharge instruction handout references). . Discussed vitamin/mineral supplement compliance Pt encouraged to follow-up with outpatient RD. 2.RD to remain available to reinforce nutrition education as requested by patient.   2) Upon discharge, recommend liquid/chewable/crushable calcium citrate + Vitamin D   3) Upon discharge, recommend liquid, chewable or crushable MVI with iron and sublingual vitamin B12 Pt reports following a full liquid diet for 2 weeks PTA as instructed by outpatient RD. Pt reports having Premier protein. Confirms NKFA. Takes vitamin D2 + D3, folic acid.       Pt seen for bariatric surgery consult on 2MON. Pt with multiple previous wt loss attempts per chart. Pt tried the following: diet, exercise and was unable to lose and maintain significant wt loss. Per chart, pt met with outpatient RD and weighed  340 pounds (6/28/19). Pre-surgical wt (H&P) noted as 339.9 pounds (6/28/2019). Current wt of 329.7 pounds (7/9/2019). Pt now S/P laparoscopic vertical sleeve gastrectomy. Pt denies N+V, sipping on bariatric clear liquids during RD visit. Pt with knowledge of bariatric full liquid diet and receptive to in depth review/reinforcement. Pt reports home stock of protein shakes with plans to purchase more. Pt reports purchasing the necessary vitamins/minerals in chewable/liquid/crushable form including a multivitamin with added elemental iron, calcium citrate with vitamin D, and sublingual B12. Pt was advised to take the multivitamin with elemental iron at least 2 hours apart from the calcium citrate with vitamin D for optimal absorption. Pt plans to schedule a follow up appointment with outpatient RD. Pt able to teach back all points discussed during interview.      Diet Education:   1) Laparoscopic vertical sleeve gastrectomy recommendations reviewed/reinforced (discharge instruction handout references). . Discussed vitamin/mineral supplement compliance Pt encouraged to follow-up with outpatient RD. 2.RD to remain available to reinforce nutrition education as requested by patient.   2) Upon discharge, recommend liquid/chewable/crushable calcium citrate + Vitamin D   3) Upon discharge, recommend liquid, chewable or crushable MVI with iron and sublingual vitamin B12

## 2019-07-10 NOTE — DISCHARGE NOTE PROVIDER - NSDCCPCAREPLAN_GEN_ALL_CORE_FT
PRINCIPAL DISCHARGE DIAGNOSIS  Diagnosis: Morbid (severe) obesity due to excess calories  Assessment and Plan of Treatment: Activity- No heavy lifting or straining over 15 lbs for the next two weeks;  Driving- Please do not drive until your pain is well controlled and you do not need to take pain medications.  You may shower-Do not submerge or scrub incision sites.  Please pat dry incisions/dressings.  Leave the white steri strips in place, they will fall off on their own in approximately 5-7 days.      SECONDARY DISCHARGE DIAGNOSES  Diagnosis: Acid reflux  Assessment and Plan of Treatment:     Diagnosis: Sleep apnea, obstructive  Assessment and Plan of Treatment:

## 2019-07-10 NOTE — PROGRESS NOTE ADULT - SUBJECTIVE AND OBJECTIVE BOX
Bariatric Surgery Progress Note    Post Op Day#: 1    24 hr events/Subjective:     No acute issues overnight  Pain controlled with medications  Nausea controlled with anti-ematics   Voided 1050 mL  He has not yet passed flatus nor had a bowel movement      Procedure:  Gastrectomy, sleeve, laparoscopic, with laparoscopic hiatal hernia repair  Gastrectomy, sleeve      Vital Signs Last 24 Hrs  T(C): 37.1 (10 Jul 2019 00:54), Max: 37.1 (09 Jul 2019 18:07)  T(F): 98.8 (10 Jul 2019 00:54), Max: 98.8 (10 Jul 2019 00:54)  HR: 95 (10 Jul 2019 00:54) (81 - 103)  BP: 149/77 (10 Jul 2019 00:54) (105/59 - 153/90)  BP(mean): 83 (09 Jul 2019 14:00) (82 - 112)  RR: 18 (10 Jul 2019 00:54) (12 - 18)  SpO2: 95% (10 Jul 2019 00:54) (94% - 100%)  Height (cm): 175.26 (07-09 @ 08:08)  Weight (kg): 149.9 (07-09 @ 08:08)  BMI (kg/m2): 48.8 (07-09 @ 08:08)  BSA (m2): 2.56 (07-09 @ 08:08)  I&O's Summary    09 Jul 2019 07:01  -  10 Jul 2019 04:43  --------------------------------------------------------  IN: 1890 mL / OUT: 1050 mL / NET: 840 mL      I&O's Detail    09 Jul 2019 07:01  -  10 Jul 2019 04:43  --------------------------------------------------------  IN:    lactated ringers.: 150 mL    multivitamin/thiamine/folic acid in sodium chloride 0.9%: 1500 mL    Oral Fluid: 240 mL  Total IN: 1890 mL    OUT:    Voided: 1050 mL  Total OUT: 1050 mL    Total NET: 840 mL          Physical Exam:    General:  Appears stated age, well-groomed, well-nourished, no distress  Chest:  clear breath sounds  Cardiovascular:  Regular rate & rhythm  Abdomen:  Soft, incisions clean, dry intact, appropriate tenderness around incisions, no rebound or guarding  Skin:  No rash  Neuro/Psych:  Alert, oriented to time, place and person                           16.9   21.2  )-----------( 361      ( 09 Jul 2019 11:51 )             48.9       07-09    135  |  95<L>  |  12  ----------------------------<  101<H>  4.3   |  21<L>  |  0.96    Ca    9.7      09 Jul 2019 11:51        acetaminophen  IVPB .. milliGRAM(s) IV Intermittent once  heparin  Injectable Unit(s) SubCutaneous every 8 hours  hyoscyamine SL milliGRAM(s) SubLingual every 6 hours  ketorolac   Injectable milliGRAM(s) IV Push every 6 hours  lactated ringers. milliLiter(s) IV Continuous <Continuous>  multivitamin/thiamine/folic acid in sodium chloride 0.9% milliLiter(s) IV Continuous <Continuous>  ondansetron Injectable milliGRAM(s) IV Push every 6 hours  oxyCODONE    Solution milliGRAM(s) Oral every 4 hours PRN  pantoprazole  Injectable milliGRAM(s) IV Push every 24 hours Bariatric Surgery Progress Note    Post Op Day#: 1    24 hr events/Subjective:     Tachy to 103-104 overnight, but 77 this morning on exam  Complaining of some abd pain this morning, was advised that he can ask for more pain medication  Nausea controlled with anti-emetics   Voided 1050 mL  He has not yet passed flatus nor had a bowel movement      Procedure:  Gastrectomy, sleeve, laparoscopic, with laparoscopic hiatal hernia repair  Gastrectomy, sleeve      Vital Signs Last 24 Hrs  T(C): 37.1 (10 Jul 2019 00:54), Max: 37.1 (09 Jul 2019 18:07)  T(F): 98.8 (10 Jul 2019 00:54), Max: 98.8 (10 Jul 2019 00:54)  HR: 95 (10 Jul 2019 00:54) (81 - 103)  BP: 149/77 (10 Jul 2019 00:54) (105/59 - 153/90)  BP(mean): 83 (09 Jul 2019 14:00) (82 - 112)  RR: 18 (10 Jul 2019 00:54) (12 - 18)  SpO2: 95% (10 Jul 2019 00:54) (94% - 100%)  Height (cm): 175.26 (07-09 @ 08:08)  Weight (kg): 149.9 (07-09 @ 08:08)  BMI (kg/m2): 48.8 (07-09 @ 08:08)  BSA (m2): 2.56 (07-09 @ 08:08)  I&O's Summary    09 Jul 2019 07:01  -  10 Jul 2019 04:43  --------------------------------------------------------  IN: 1890 mL / OUT: 1050 mL / NET: 840 mL      I&O's Detail    09 Jul 2019 07:01  -  10 Jul 2019 04:43  --------------------------------------------------------  IN:    lactated ringers.: 150 mL    multivitamin/thiamine/folic acid in sodium chloride 0.9%: 1500 mL    Oral Fluid: 240 mL  Total IN: 1890 mL    OUT:    Voided: 1050 mL  Total OUT: 1050 mL    Total NET: 840 mL          Physical Exam:    General:  Appears stated age, well-groomed, well-nourished, no distress  Chest:  clear breath sounds  Cardiovascular:  Regular rate & rhythm  Abdomen:  Soft, incisions clean with some scant drainage. appropriate tenderness around incisions, no rebound or guarding  Skin:  No rash  Neuro/Psych:  Alert, oriented to time, place and person                           16.9   21.2  )-----------( 361      ( 09 Jul 2019 11:51 )             48.9       07-09    135  |  95<L>  |  12  ----------------------------<  101<H>  4.3   |  21<L>  |  0.96    Ca    9.7      09 Jul 2019 11:51        acetaminophen  IVPB .. milliGRAM(s) IV Intermittent once  heparin  Injectable Unit(s) SubCutaneous every 8 hours  hyoscyamine SL milliGRAM(s) SubLingual every 6 hours  ketorolac   Injectable milliGRAM(s) IV Push every 6 hours  lactated ringers. milliLiter(s) IV Continuous <Continuous>  multivitamin/thiamine/folic acid in sodium chloride 0.9% milliLiter(s) IV Continuous <Continuous>  ondansetron Injectable milliGRAM(s) IV Push every 6 hours  oxyCODONE    Solution milliGRAM(s) Oral every 4 hours PRN  pantoprazole  Injectable milliGRAM(s) IV Push every 24 hours

## 2019-07-10 NOTE — DISCHARGE NOTE PROVIDER - NSDCACTIVITY_GEN_ALL_CORE
No heavy lifting/straining/Do not drive or operate machinery/Showering allowed/Do not make important decisions

## 2019-07-17 LAB — SURGICAL PATHOLOGY STUDY: SIGNIFICANT CHANGE UP

## 2019-07-22 ENCOUNTER — APPOINTMENT (OUTPATIENT)
Dept: SURGERY | Facility: CLINIC | Age: 34
End: 2019-07-22
Payer: COMMERCIAL

## 2019-07-22 VITALS
HEIGHT: 70 IN | HEART RATE: 91 BPM | WEIGHT: 314 LBS | BODY MASS INDEX: 44.95 KG/M2 | OXYGEN SATURATION: 96 % | RESPIRATION RATE: 18 BRPM | SYSTOLIC BLOOD PRESSURE: 109 MMHG | DIASTOLIC BLOOD PRESSURE: 74 MMHG | TEMPERATURE: 98.2 F

## 2019-07-22 PROCEDURE — 99024 POSTOP FOLLOW-UP VISIT: CPT

## 2019-07-30 NOTE — PATIENT PROFILE ADULT - NSPROSPIRITUALVALUESFT_GEN_A_NUR
*[facility]*  *[facility]*  [address] [place] [address] [id number] [phone]    Transthoracic Echocardiogram (TTE)    Patient: Anabela Donahue  Study Date/Time:   2019 2:08PM  MRN:     [id number]        FIN#:              [id number]  :     1937       Ht/Wt:             154cm 41kg  Age:     81               BSA/BMI:           1.34m^2 17.3kg/m^2  Gender:  F                Baseline BP:       124 / 72    Ordering Physician:   Lena Tatum MD    Referring Physician:  Staff Arsen Perez Staff Arsen    Attending Physician:  Van Bruce  Diagnostic Physician: Vinnie Martinez DO  Sonographer:          MARILOU Bermeo    -------------------------------------------------------------------  INDICATIONS:   Chest pain.    -------------------------------------------------------------------  STUDY CONCLUSIONS  SUMMARY:    1. Left ventricle: The cavity size is normal. Wall thickness is     mildly increased. Systolic function is normal. The estimated     ejection fraction is 60-65%.  2. No significant mitral or tricuspid regurgitation    -------------------------------------------------------------------  STUDY DATA:   Procedure:  Transthoracic echocardiography was  performed. Image quality was good.  M-mode, complete 2D, complete  spectral Doppler, and color Doppler.  Study status:  Routine.  Study completion:  There were no complications.    FINDINGS    LEFT VENTRICLE:  The cavity size is normal. Wall thickness is  mildly increased. Systolic function is normal. The estimated  ejection fraction is 60-65%. Wall motion is normal; there are no  regional wall motion abnormalities. Left ventricular diastolic  function parameters are normal.    AORTIC VALVE:   Structurally normal valve. The valve is trileaflet.   Cusp separation is normal.  Doppler:  Transvalvular velocity is  within the normal range. There is no stenosis.  No regurgitation.    AORTA:  Aortic root:  The aortic root is normal in size.    MITRAL VALVE:   Structurally normal valve.   Leaflet separation is  normal.  Doppler:  Transvalvular velocity is within the normal  range. There is no evidence for stenosis.  No regurgitation.    LEFT ATRIUM:  The atrium is normal in size.    RIGHT VENTRICLE:  The cavity size is normal. Wall thickness is  normal. Systolic function is normal.    PULMONIC VALVE:    Structurally normal valve.   Cusp separation is  normal.  Doppler:  Transvalvular velocity is within the normal  range.  No regurgitation.    TRICUSPID VALVE:   Structurally normal valve.   Leaflet separation  is normal.  Doppler:  Transvalvular velocity is within the normal  range.  No regurgitation.    PULMONARY ARTERY:   The main pulmonary artery is normal-sized.  Systolic pressure is within the normal range.    RIGHT ATRIUM:  The atrium is normal in size.    PERICARDIUM:  There is no pericardial effusion.    SYSTEMIC VEINS:  Inferior vena cava: The vessel is normal in size.    BASELINE ECG:   Normal sinus rhythm.    -------------------------------------------------------------------  Measurements     Left ventricle             Value        Ref        Ventricular septum        Value          Ref  DAVID, LAX chord         (L) 2.4   cm     3.8 - 5.2  IVS, ED, LAX              0.8   cm       0.6 -  ESD, LAX chord         (L) 1.6   cm     2.2 - 3.5                                           0.9  DAVID/bsa, LAX chord     (L) 1.8   cm/m^2 2.3 - 3.1  IVS, ED                   0.8   cm       0.6 -  ESD/bsa, LAX chord     (L) 1.2   cm/m^2 1.3 - 2.1                                           0.9  Mid-wall FS, LAX chord (L) 9     %      15 - 23    IVS, ES                   1.4   cm       --------  PW, ED, LAX            (H) 1.2   cm     0.6 - 0.9  IVS thickening            73    %        --------  PW thickening, LAX         4     %      ---------   IVS/PW, ED, LAX            0.68         ---------  Right ventricle           Value           Ref  DAVID major ax, A4C          5.2   cm     ---------  DAVID, LAX                  2.6   cm       --------  ESD major ax, A4C          4.7   cm     ---------   FS major axis, A4C         8     %      ---------  Left atrium               Value          Ref  DAVID/bsa major ax, A4C      3.9   cm/m^2 ---------  Area ES, A4C              8     cm^2     <=20  ESD/bsa major ax, A4C      3.5   cm/m^2 ---------  Vol, S            (L)     14    ml       22 - 52  ASYA, A4C                   14.4  cm^2   ---------  Vol/bsa, S        (L)     10    ml/m^2   16 - 34  ZAINA, A4C                   8.6   cm^2   ---------  Vol, ES, 1-p A4C  (L)     14    ml       22 - 52  FAC, A4C                   40    %      ---------  Vol/bsa, ES, 1-p  (L)     10    ml/m^2   11 - 40  PW, ED                 (H) 1.2   cm     0.6 - 0.9  A4C   PW, ES                     1.3   cm     ---------  AP dim, ES MM             2.9   cm       2.7 -  PW thickening              4     %      ---------                                           3.8  IVS/PW, ED                 0.68         ---------  AP dim index, ES          2.2   cm/m^2   1.5 -  EDV                    (L) 15    ml     46 - 106   MM                                       2.3  ESV                    (L) 4     ml     14 - 42   SV                         14    ml     ---------  Aortic valve              Value          Ref  EDV/bsa                (L) 11    ml/m^2 29 - 61    Leaflet sep, MM           1.4   cm       --------  ESV/bsa                (L) 3     ml/m^2 8 - 24   SV/bsa                     10    ml/m^2 ---------  Mitral valve              Value          Ref  SV, 1-p A4C                20    ml     ---------  E-septal                  0.4   cm       --------  SV/bsa, 1-p A4C            15    ml/m^2 ---------  separation   ESV, 2-p               (L) 13    ml     14 - 42    Peak E                    0.5   m/sec    --------  ESV/bsa, 2-p               10    ml/m^2 8 - 24     Peak A                     0.68  m/sec    --------                                                      Decel time                183   ms       --------                                                      PHT                       54    ms       --------                                                      Peak E/A ratio            0.7            --------                                                      MVA, PHT                  4.1   cm^2     --------                                                      MVA/bsa, PHT              3.04  cm^2/m^2 --------                                                        Aortic root               Value          Ref                                                      Root diam, ED             3.2   cm       <3.7    Legend:  (L)  and  (H)  daniel values outside specified reference range.    Prepared and electronically signed by  Vinnie Martinez DO  07/30/2019 13:59     none

## 2019-08-28 ENCOUNTER — EMERGENCY (EMERGENCY)
Facility: HOSPITAL | Age: 34
LOS: 0 days | Discharge: HOME | End: 2019-08-28
Attending: EMERGENCY MEDICINE
Payer: COMMERCIAL

## 2019-08-28 VITALS
OXYGEN SATURATION: 97 % | SYSTOLIC BLOOD PRESSURE: 95 MMHG | DIASTOLIC BLOOD PRESSURE: 60 MMHG | RESPIRATION RATE: 17 BRPM | TEMPERATURE: 98 F | HEART RATE: 83 BPM

## 2019-08-28 VITALS — WEIGHT: 199.96 LBS | HEIGHT: 70 IN

## 2019-08-28 DIAGNOSIS — N13.2 HYDRONEPHROSIS WITH RENAL AND URETERAL CALCULOUS OBSTRUCTION: ICD-10-CM

## 2019-08-28 DIAGNOSIS — Z94.7 CORNEAL TRANSPLANT STATUS: Chronic | ICD-10-CM

## 2019-08-28 DIAGNOSIS — Z98.84 BARIATRIC SURGERY STATUS: ICD-10-CM

## 2019-08-28 DIAGNOSIS — E66.01 MORBID (SEVERE) OBESITY DUE TO EXCESS CALORIES: ICD-10-CM

## 2019-08-28 DIAGNOSIS — K21.9 GASTRO-ESOPHAGEAL REFLUX DISEASE WITHOUT ESOPHAGITIS: ICD-10-CM

## 2019-08-28 DIAGNOSIS — G47.30 SLEEP APNEA, UNSPECIFIED: ICD-10-CM

## 2019-08-28 DIAGNOSIS — R10.32 LEFT LOWER QUADRANT PAIN: ICD-10-CM

## 2019-08-28 LAB
ALBUMIN SERPL ELPH-MCNC: 3.8 G/DL — SIGNIFICANT CHANGE UP (ref 3.3–5)
ALP SERPL-CCNC: 80 U/L — SIGNIFICANT CHANGE UP (ref 40–120)
ALT FLD-CCNC: 125 U/L — HIGH (ref 12–78)
ANION GAP SERPL CALC-SCNC: 13 MMOL/L — SIGNIFICANT CHANGE UP (ref 5–17)
AST SERPL-CCNC: 97 U/L — HIGH (ref 15–37)
BASOPHILS # BLD AUTO: 0.08 K/UL — SIGNIFICANT CHANGE UP (ref 0–0.2)
BASOPHILS NFR BLD AUTO: 0.6 % — SIGNIFICANT CHANGE UP (ref 0–2)
BILIRUB SERPL-MCNC: 1.2 MG/DL — SIGNIFICANT CHANGE UP (ref 0.2–1.2)
BUN SERPL-MCNC: 8 MG/DL — SIGNIFICANT CHANGE UP (ref 7–23)
CALCIUM SERPL-MCNC: 9.5 MG/DL — SIGNIFICANT CHANGE UP (ref 8.5–10.1)
CHLORIDE SERPL-SCNC: 100 MMOL/L — SIGNIFICANT CHANGE UP (ref 96–108)
CO2 SERPL-SCNC: 27 MMOL/L — SIGNIFICANT CHANGE UP (ref 22–31)
CREAT SERPL-MCNC: 0.99 MG/DL — SIGNIFICANT CHANGE UP (ref 0.5–1.3)
EOSINOPHIL # BLD AUTO: 0.19 K/UL — SIGNIFICANT CHANGE UP (ref 0–0.5)
EOSINOPHIL NFR BLD AUTO: 1.5 % — SIGNIFICANT CHANGE UP (ref 0–6)
GLUCOSE SERPL-MCNC: 81 MG/DL — SIGNIFICANT CHANGE UP (ref 70–99)
HCT VFR BLD CALC: 48.4 % — SIGNIFICANT CHANGE UP (ref 39–50)
HGB BLD-MCNC: 15.8 G/DL — SIGNIFICANT CHANGE UP (ref 13–17)
IMM GRANULOCYTES NFR BLD AUTO: 0.3 % — SIGNIFICANT CHANGE UP (ref 0–1.5)
LACTATE SERPL-SCNC: 1.3 MMOL/L — SIGNIFICANT CHANGE UP (ref 0.7–2)
LIDOCAIN IGE QN: 170 U/L — SIGNIFICANT CHANGE UP (ref 73–393)
LYMPHOCYTES # BLD AUTO: 1.78 K/UL — SIGNIFICANT CHANGE UP (ref 1–3.3)
LYMPHOCYTES # BLD AUTO: 13.9 % — SIGNIFICANT CHANGE UP (ref 13–44)
MCHC RBC-ENTMCNC: 29.5 PG — SIGNIFICANT CHANGE UP (ref 27–34)
MCHC RBC-ENTMCNC: 32.6 GM/DL — SIGNIFICANT CHANGE UP (ref 32–36)
MCV RBC AUTO: 90.5 FL — SIGNIFICANT CHANGE UP (ref 80–100)
MONOCYTES # BLD AUTO: 1.34 K/UL — HIGH (ref 0–0.9)
MONOCYTES NFR BLD AUTO: 10.5 % — SIGNIFICANT CHANGE UP (ref 2–14)
NEUTROPHILS # BLD AUTO: 9.39 K/UL — HIGH (ref 1.8–7.4)
NEUTROPHILS NFR BLD AUTO: 73.2 % — SIGNIFICANT CHANGE UP (ref 43–77)
PLATELET # BLD AUTO: 255 K/UL — SIGNIFICANT CHANGE UP (ref 150–400)
POTASSIUM SERPL-MCNC: 3.9 MMOL/L — SIGNIFICANT CHANGE UP (ref 3.5–5.3)
POTASSIUM SERPL-SCNC: 3.9 MMOL/L — SIGNIFICANT CHANGE UP (ref 3.5–5.3)
PROT SERPL-MCNC: 7.1 GM/DL — SIGNIFICANT CHANGE UP (ref 6–8.3)
RBC # BLD: 5.35 M/UL — SIGNIFICANT CHANGE UP (ref 4.2–5.8)
RBC # FLD: 14 % — SIGNIFICANT CHANGE UP (ref 10.3–14.5)
SODIUM SERPL-SCNC: 140 MMOL/L — SIGNIFICANT CHANGE UP (ref 135–145)
WBC # BLD: 12.82 K/UL — HIGH (ref 3.8–10.5)
WBC # FLD AUTO: 12.82 K/UL — HIGH (ref 3.8–10.5)

## 2019-08-28 PROCEDURE — 99284 EMERGENCY DEPT VISIT MOD MDM: CPT | Mod: 25

## 2019-08-28 PROCEDURE — 83605 ASSAY OF LACTIC ACID: CPT

## 2019-08-28 PROCEDURE — 96374 THER/PROPH/DIAG INJ IV PUSH: CPT | Mod: XU

## 2019-08-28 PROCEDURE — 80053 COMPREHEN METABOLIC PANEL: CPT

## 2019-08-28 PROCEDURE — 74177 CT ABD & PELVIS W/CONTRAST: CPT | Mod: 26

## 2019-08-28 PROCEDURE — 36415 COLL VENOUS BLD VENIPUNCTURE: CPT

## 2019-08-28 PROCEDURE — 83690 ASSAY OF LIPASE: CPT

## 2019-08-28 PROCEDURE — 85025 COMPLETE CBC W/AUTO DIFF WBC: CPT

## 2019-08-28 PROCEDURE — 74177 CT ABD & PELVIS W/CONTRAST: CPT

## 2019-08-28 PROCEDURE — 96376 TX/PRO/DX INJ SAME DRUG ADON: CPT

## 2019-08-28 PROCEDURE — 99284 EMERGENCY DEPT VISIT MOD MDM: CPT

## 2019-08-28 RX ORDER — SODIUM CHLORIDE 9 MG/ML
1000 INJECTION INTRAMUSCULAR; INTRAVENOUS; SUBCUTANEOUS ONCE
Refills: 0 | Status: DISCONTINUED | OUTPATIENT
Start: 2019-08-28 | End: 2019-08-28

## 2019-08-28 RX ORDER — CEPHALEXIN 500 MG
1 CAPSULE ORAL
Qty: 14 | Refills: 0
Start: 2019-08-28 | End: 2019-09-03

## 2019-08-28 RX ORDER — MORPHINE SULFATE 50 MG/1
4 CAPSULE, EXTENDED RELEASE ORAL ONCE
Refills: 0 | Status: DISCONTINUED | OUTPATIENT
Start: 2019-08-28 | End: 2019-08-28

## 2019-08-28 RX ORDER — HYDROMORPHONE HYDROCHLORIDE 2 MG/ML
1 INJECTION INTRAMUSCULAR; INTRAVENOUS; SUBCUTANEOUS ONCE
Refills: 0 | Status: DISCONTINUED | OUTPATIENT
Start: 2019-08-28 | End: 2019-08-28

## 2019-08-28 RX ORDER — SODIUM CHLORIDE 9 MG/ML
2800 INJECTION INTRAMUSCULAR; INTRAVENOUS; SUBCUTANEOUS ONCE
Refills: 0 | Status: COMPLETED | OUTPATIENT
Start: 2019-08-28 | End: 2019-08-28

## 2019-08-28 RX ORDER — OXYCODONE HYDROCHLORIDE 5 MG/1
1 TABLET ORAL
Qty: 12 | Refills: 0
Start: 2019-08-28

## 2019-08-28 RX ADMIN — HYDROMORPHONE HYDROCHLORIDE 1 MILLIGRAM(S): 2 INJECTION INTRAMUSCULAR; INTRAVENOUS; SUBCUTANEOUS at 19:34

## 2019-08-28 RX ADMIN — MORPHINE SULFATE 4 MILLIGRAM(S): 50 CAPSULE, EXTENDED RELEASE ORAL at 18:49

## 2019-08-28 RX ADMIN — MORPHINE SULFATE 4 MILLIGRAM(S): 50 CAPSULE, EXTENDED RELEASE ORAL at 19:05

## 2019-08-28 RX ADMIN — SODIUM CHLORIDE 2800 MILLILITER(S): 9 INJECTION INTRAMUSCULAR; INTRAVENOUS; SUBCUTANEOUS at 18:49

## 2019-08-28 RX ADMIN — HYDROMORPHONE HYDROCHLORIDE 1 MILLIGRAM(S): 2 INJECTION INTRAMUSCULAR; INTRAVENOUS; SUBCUTANEOUS at 19:19

## 2019-08-28 NOTE — ED STATDOCS - OBJECTIVE STATEMENT
34 y/o m with PMHx of morbid obesity, sleep apnea, Vitamin D deficiency, GERD, s/p cornea surgery, s/p gastric sleeve 7/19 by Dr. Conor Hayden presenting to the ED c/o worsening LLQ abd pain radiating to left lower back since yesterday. States he can't find any comfortable position. Pain began yesterday but relieved after BM. Also reports +blood in stool yesterday, +dark stools x1 week. No medications taken for pain PTA. Denies fever, chills, any other acute c/o. No hx of kidney stones in the past. Never smoker, no EtOH or recreational drug use. GI surgeon: Dr. Conor Hayden at [facility] ([phone]). 34 y/o male with PMHx of morbid obesity, sleep apnea, Vitamin D deficiency, GERD, s/p cornea surgery, s/p gastric sleeve 7/19 by Dr. Conor Hayden presenting to the ED c/o worsening LLQ abd pain radiating to left lower back since yesterday. States he can't find any comfortable position. Pain began yesterday but relieved after BM. Also reports +dark stools x1 week. No medications taken for pain PTA. Denies fever, chills, any other acute c/o. No hx of kidney stones in the past. Never smoker, no EtOH or recreational drug use. GI surgeon: Dr. Conor Hayden at [facility] ([phone]).

## 2019-08-28 NOTE — ED STATDOCS - CARE PROVIDER_API CALL
Chintan Steele)  Urology  [address] [place] [address], [address] [place] [address]  Phone: [phone]  Fax: [phone]  Follow Up Time:

## 2019-08-28 NOTE — ED STATDOCS - NEUROLOGICAL, MLM
sensation is normal and strength is normal. sensation is normal and strength is normal. CNs 2-12 grossly intact. NIH=0 GCS=15

## 2019-08-28 NOTE — ED ADULT NURSE NOTE - OBJECTIVE STATEMENT
Pt came to the Ed for eval of severe abd pain since yesterday. Pt had gastric sleeve sx performed in July. Pt states his pain is severe in nature and also noted his stool to be dark. Denies fevers.

## 2019-08-28 NOTE — ED STATDOCS - PMH
Acid reflux    Morbid obesity    Sleep apnea, obstructive  with CPAP  Vitamin D deficiency  on medication

## 2019-08-28 NOTE — ED STATDOCS - NSFOLLOWUPINSTRUCTIONS_ED_ALL_ED_FT
Kidney Stones    WHAT YOU NEED TO KNOW:    Kidney stones form in the urinary system when the water and waste in your urine are out of balance. When this happens, certain types of waste crystals separate from the urine. The crystals build up and form kidney stones. You may have more than one kidney stone.     DISCHARGE INSTRUCTIONS:    Return to the emergency department if:     You have vomiting that is not relieved by medicine.        Contact your healthcare provider if:     You have a fever.       You have trouble passing urine.      You see blood in your urine.      You have severe pain.      You have any questions or concerns about your condition or care.    Medicines:     NSAIDs, such as ibuprofen, help decrease swelling, pain, and fever. This medicine is available with or without a doctor's order. NSAIDs can cause stomach bleeding or kidney problems in certain people. If you take blood thinner medicine, always ask your healthcare provider if NSAIDs are safe for you. Always read the medicine label and follow directions.      Prescription pain medicine may be given. Ask your healthcare provider how to take this medicine safely. Some prescription pain medicines contain acetaminophen. Do not take other medicines that contain acetaminophen without talking to your healthcare provider. Too much acetaminophen may cause liver damage. Prescription pain medicine may cause constipation. Ask your healthcare provider how to prevent or treat constipation.       Medicines to balance your electrolytes may be needed.       Take your medicine as directed. Contact your healthcare provider if you think your medicine is not helping or if you have side effects. Tell him or her if you are allergic to any medicine. Keep a list of the medicines, vitamins, and herbs you take. Include the amounts, and when and why you take them. Bring the list or the pill bottles to follow-up visits. Carry your medicine list with you in case of an emergency.    Follow up with your healthcare provider as directed: You may need to return for more tests. Write down your questions so you remember to ask them during your visits.    What you can do to manage kidney stones:     Drink more liquids. Your healthcare provider may tell you to drink at least 8 to 12 (eight-ounce) cups of liquids each day. This helps flush out the kidney stones when you urinate. Water is the best liquid to drink.      Strain your urine every time you go to the bathroom. Urinate through a strainer or a piece of thin cloth to catch the stones. Take the stones to your healthcare provider so they can be sent to the lab for tests. This will help your healthcare providers plan the best treatment for you.Look for Stones in the Filter           Eat a variety of healthy foods. Healthy foods include fruits, vegetables, whole-grain breads, low-fat dairy products, beans, and fish. You may need to limit how much sodium (salt) or protein you eat. Ask for information about the best foods for you.      Stay active. Your stones may pass more easily if you stay active. Exercise can also help you manage your weight. Ask about the best activities for you.    After you pass the kidney stones: Your healthcare provider may order a 24-hour urine test. Results from a 24-hour urine test will help your healthcare provider plan ways to prevent more stones from forming. Your healthcare provider will give you more instructions.

## 2019-08-28 NOTE — ED STATDOCS - PROGRESS NOTE DETAILS
Ginette Holman for ED attending Dr. Odonnell: Stool guaiac performed by Dr. Odonnell. Lot #: [id number]; Result: Guaiac negative; QC- reactive; Expiration Date: 12/31/19 Patient seen and evaluated s/p workup.  +kidney stone, 2mm, will likely pass.  2 intrarenal stones as well.  He admits to a high calcium diet s/p his surgery.  Recommended he see his bariatric surgeon to discuss the kidney stones and he will also follow up with a urologist.  As lactate is negative, low suspicion for infected stone.  Will treat with keflex prophylactically.  Return precautions reviewed -Lynda Carranza PA-C Ginette Holman for ED attending Dr. Odonnell: Chaperoned Stool guaiac performed by Dr. Odonnell. Lot #: [id number]; Result: Guaiac negative; QC- reactive; Expiration Date: 12/31/19

## 2019-08-28 NOTE — ED STATDOCS - ATTENDING CONTRIBUTION TO CARE
I Lewis Odonnell MD saw and examined the patient. MLP saw and examined the patient under my supervision. I discussed the care of the patient with MLP and agree with MLP's plan, assessment and care of the patient while in the ED.

## 2019-08-28 NOTE — ED STATDOCS - PATIENT PORTAL LINK FT
You can access the FollowMyHealth Patient Portal offered by [facility] by registering at the following website: [email or web address]. By joining [employer]’s FollowMyHealth portal, you will also be able to view your health information using other applications (apps) compatible with our system.

## 2019-08-28 NOTE — ED STATDOCS - GASTROINTESTINAL, MLM
abdomen soft, and non-distended. Bowel sounds present. No CVA TTP. abdomen soft, and non-distended. Bowel sounds present. No CVAT.

## 2019-08-28 NOTE — ED STATDOCS - CLINICAL SUMMARY MEDICAL DECISION MAKING FREE TEXT BOX
Severe out of proportion pain to examination, blood work, CT, pt consented to rectal exam, based on results of guaiac testing if pos will perform CT angiography, otherwise CT abd with IV contrast.

## 2019-08-28 NOTE — ED ADULT TRIAGE NOTE - CHIEF COMPLAINT QUOTE
Patient presents to ED complaining of worsening abdominal pain since yesterday. Patient had gastric surgery done July 9th and states its similar pain to that.  Patient had similar pain yesterday but was relieved after he had a bowel movement. Denies taking anything PTA

## 2019-08-28 NOTE — ED STATDOCS - NS_ ATTENDINGSCRIBEDETAILS _ED_A_ED_FT
I Lewis Odonnell MD saw and examined the patient. Scribe documented for me and under my supervision. I have modified the scribe's documentation where necessary to reflect my history, physical exam and other relevant documentations pertinent to the care of the patient.

## 2019-08-28 NOTE — ED STATDOCS - CPE ED CARDIAC NORM
Incoming from pt. Two patient Identifiers confirmed. Pt stated he had been buying test strips out of pocket and would like to see if it can be covered by his insurance. Please escribe to pharmacy listed.  Pt stated he tests TID before each meal. normal...

## 2019-08-28 NOTE — ED STATDOCS - CARE PLAN
Principal Discharge DX:	Kidney stone Principal Discharge DX:	Kidney stone  Secondary Diagnosis:	Abdominal pain, unspecified abdominal location

## 2019-08-28 NOTE — ED ADULT NURSE NOTE - NS ED NURSE DC INFO COMPLEXITY
D/C instructions not given/Simple: Patient demonstrates quick and easy understanding/Verbalized Understanding

## 2019-09-05 ENCOUNTER — EMERGENCY (EMERGENCY)
Facility: HOSPITAL | Age: 34
LOS: 0 days | Discharge: HOME | End: 2019-09-05
Attending: EMERGENCY MEDICINE
Payer: COMMERCIAL

## 2019-09-05 VITALS — DIASTOLIC BLOOD PRESSURE: 70 MMHG | HEART RATE: 80 BPM | SYSTOLIC BLOOD PRESSURE: 121 MMHG | TEMPERATURE: 98 F

## 2019-09-05 VITALS — WEIGHT: 294.98 LBS | HEIGHT: 70 IN

## 2019-09-05 DIAGNOSIS — Z98.84 BARIATRIC SURGERY STATUS: ICD-10-CM

## 2019-09-05 DIAGNOSIS — N20.0 CALCULUS OF KIDNEY: ICD-10-CM

## 2019-09-05 DIAGNOSIS — Z94.7 CORNEAL TRANSPLANT STATUS: Chronic | ICD-10-CM

## 2019-09-05 DIAGNOSIS — R10.9 UNSPECIFIED ABDOMINAL PAIN: ICD-10-CM

## 2019-09-05 DIAGNOSIS — Z87.442 PERSONAL HISTORY OF URINARY CALCULI: ICD-10-CM

## 2019-09-05 DIAGNOSIS — R31.9 HEMATURIA, UNSPECIFIED: ICD-10-CM

## 2019-09-05 DIAGNOSIS — K21.9 GASTRO-ESOPHAGEAL REFLUX DISEASE WITHOUT ESOPHAGITIS: ICD-10-CM

## 2019-09-05 LAB
ALBUMIN SERPL ELPH-MCNC: 3.4 G/DL — SIGNIFICANT CHANGE UP (ref 3.3–5)
ALP SERPL-CCNC: 77 U/L — SIGNIFICANT CHANGE UP (ref 40–120)
ALT FLD-CCNC: 84 U/L — HIGH (ref 12–78)
ANION GAP SERPL CALC-SCNC: 16 MMOL/L — SIGNIFICANT CHANGE UP (ref 5–17)
APPEARANCE UR: CLEAR — SIGNIFICANT CHANGE UP
AST SERPL-CCNC: 73 U/L — HIGH (ref 15–37)
BASOPHILS # BLD AUTO: 0.06 K/UL — SIGNIFICANT CHANGE UP (ref 0–0.2)
BASOPHILS NFR BLD AUTO: 0.5 % — SIGNIFICANT CHANGE UP (ref 0–2)
BILIRUB SERPL-MCNC: 1.1 MG/DL — SIGNIFICANT CHANGE UP (ref 0.2–1.2)
BILIRUB UR-MCNC: NEGATIVE — SIGNIFICANT CHANGE UP
BUN SERPL-MCNC: 6 MG/DL — LOW (ref 7–23)
CALCIUM SERPL-MCNC: 9.2 MG/DL — SIGNIFICANT CHANGE UP (ref 8.5–10.1)
CHLORIDE SERPL-SCNC: 101 MMOL/L — SIGNIFICANT CHANGE UP (ref 96–108)
CO2 SERPL-SCNC: 22 MMOL/L — SIGNIFICANT CHANGE UP (ref 22–31)
COLOR SPEC: YELLOW — SIGNIFICANT CHANGE UP
CREAT SERPL-MCNC: 0.74 MG/DL — SIGNIFICANT CHANGE UP (ref 0.5–1.3)
DIFF PNL FLD: ABNORMAL
EOSINOPHIL # BLD AUTO: 0.68 K/UL — HIGH (ref 0–0.5)
EOSINOPHIL NFR BLD AUTO: 6.2 % — HIGH (ref 0–6)
GLUCOSE SERPL-MCNC: 72 MG/DL — SIGNIFICANT CHANGE UP (ref 70–99)
GLUCOSE UR QL: NEGATIVE MG/DL — SIGNIFICANT CHANGE UP
HCT VFR BLD CALC: 45.7 % — SIGNIFICANT CHANGE UP (ref 39–50)
HGB BLD-MCNC: 15.4 G/DL — SIGNIFICANT CHANGE UP (ref 13–17)
IMM GRANULOCYTES NFR BLD AUTO: 0.4 % — SIGNIFICANT CHANGE UP (ref 0–1.5)
KETONES UR-MCNC: ABNORMAL
LEUKOCYTE ESTERASE UR-ACNC: ABNORMAL
LIDOCAIN IGE QN: 136 U/L — SIGNIFICANT CHANGE UP (ref 73–393)
LYMPHOCYTES # BLD AUTO: 18.4 % — SIGNIFICANT CHANGE UP (ref 13–44)
LYMPHOCYTES # BLD AUTO: 2.01 K/UL — SIGNIFICANT CHANGE UP (ref 1–3.3)
MCHC RBC-ENTMCNC: 30 PG — SIGNIFICANT CHANGE UP (ref 27–34)
MCHC RBC-ENTMCNC: 33.7 GM/DL — SIGNIFICANT CHANGE UP (ref 32–36)
MCV RBC AUTO: 88.9 FL — SIGNIFICANT CHANGE UP (ref 80–100)
MONOCYTES # BLD AUTO: 1.57 K/UL — HIGH (ref 0–0.9)
MONOCYTES NFR BLD AUTO: 14.4 % — HIGH (ref 2–14)
NEUTROPHILS # BLD AUTO: 6.56 K/UL — SIGNIFICANT CHANGE UP (ref 1.8–7.4)
NEUTROPHILS NFR BLD AUTO: 60.1 % — SIGNIFICANT CHANGE UP (ref 43–77)
NITRITE UR-MCNC: NEGATIVE — SIGNIFICANT CHANGE UP
PH UR: 5 — SIGNIFICANT CHANGE UP (ref 5–8)
PLATELET # BLD AUTO: 270 K/UL — SIGNIFICANT CHANGE UP (ref 150–400)
POTASSIUM SERPL-MCNC: 3.8 MMOL/L — SIGNIFICANT CHANGE UP (ref 3.5–5.3)
POTASSIUM SERPL-SCNC: 3.8 MMOL/L — SIGNIFICANT CHANGE UP (ref 3.5–5.3)
PROT SERPL-MCNC: 6.8 GM/DL — SIGNIFICANT CHANGE UP (ref 6–8.3)
PROT UR-MCNC: 15 MG/DL
RBC # BLD: 5.14 M/UL — SIGNIFICANT CHANGE UP (ref 4.2–5.8)
RBC # FLD: 14.2 % — SIGNIFICANT CHANGE UP (ref 10.3–14.5)
SODIUM SERPL-SCNC: 139 MMOL/L — SIGNIFICANT CHANGE UP (ref 135–145)
SP GR SPEC: 1.02 — SIGNIFICANT CHANGE UP (ref 1.01–1.02)
UROBILINOGEN FLD QL: 1 MG/DL
WBC # BLD: 10.92 K/UL — HIGH (ref 3.8–10.5)
WBC # FLD AUTO: 10.92 K/UL — HIGH (ref 3.8–10.5)

## 2019-09-05 PROCEDURE — 85025 COMPLETE CBC W/AUTO DIFF WBC: CPT

## 2019-09-05 PROCEDURE — 96361 HYDRATE IV INFUSION ADD-ON: CPT

## 2019-09-05 PROCEDURE — 83690 ASSAY OF LIPASE: CPT

## 2019-09-05 PROCEDURE — 96375 TX/PRO/DX INJ NEW DRUG ADDON: CPT

## 2019-09-05 PROCEDURE — 81001 URINALYSIS AUTO W/SCOPE: CPT

## 2019-09-05 PROCEDURE — 87086 URINE CULTURE/COLONY COUNT: CPT

## 2019-09-05 PROCEDURE — 80053 COMPREHEN METABOLIC PANEL: CPT

## 2019-09-05 PROCEDURE — 99283 EMERGENCY DEPT VISIT LOW MDM: CPT

## 2019-09-05 PROCEDURE — 74176 CT ABD & PELVIS W/O CONTRAST: CPT

## 2019-09-05 PROCEDURE — 36415 COLL VENOUS BLD VENIPUNCTURE: CPT

## 2019-09-05 PROCEDURE — 99284 EMERGENCY DEPT VISIT MOD MDM: CPT | Mod: 25

## 2019-09-05 PROCEDURE — 96374 THER/PROPH/DIAG INJ IV PUSH: CPT

## 2019-09-05 PROCEDURE — 74176 CT ABD & PELVIS W/O CONTRAST: CPT | Mod: 26

## 2019-09-05 RX ORDER — ONDANSETRON 8 MG/1
4 TABLET, FILM COATED ORAL ONCE
Refills: 0 | Status: COMPLETED | OUTPATIENT
Start: 2019-09-05 | End: 2019-09-05

## 2019-09-05 RX ORDER — SODIUM CHLORIDE 9 MG/ML
1000 INJECTION INTRAMUSCULAR; INTRAVENOUS; SUBCUTANEOUS ONCE
Refills: 0 | Status: COMPLETED | OUTPATIENT
Start: 2019-09-05 | End: 2019-09-05

## 2019-09-05 RX ORDER — TAMSULOSIN HYDROCHLORIDE 0.4 MG/1
1 CAPSULE ORAL
Qty: 14 | Refills: 0
Start: 2019-09-05 | End: 2019-09-18

## 2019-09-05 RX ORDER — MORPHINE SULFATE 50 MG/1
4 CAPSULE, EXTENDED RELEASE ORAL ONCE
Refills: 0 | Status: DISCONTINUED | OUTPATIENT
Start: 2019-09-05 | End: 2019-09-05

## 2019-09-05 RX ADMIN — SODIUM CHLORIDE 1000 MILLILITER(S): 9 INJECTION INTRAMUSCULAR; INTRAVENOUS; SUBCUTANEOUS at 18:35

## 2019-09-05 RX ADMIN — SODIUM CHLORIDE 1000 MILLILITER(S): 9 INJECTION INTRAMUSCULAR; INTRAVENOUS; SUBCUTANEOUS at 17:22

## 2019-09-05 RX ADMIN — MORPHINE SULFATE 4 MILLIGRAM(S): 50 CAPSULE, EXTENDED RELEASE ORAL at 17:21

## 2019-09-05 RX ADMIN — SODIUM CHLORIDE 1000 MILLILITER(S): 9 INJECTION INTRAMUSCULAR; INTRAVENOUS; SUBCUTANEOUS at 18:24

## 2019-09-05 RX ADMIN — MORPHINE SULFATE 4 MILLIGRAM(S): 50 CAPSULE, EXTENDED RELEASE ORAL at 17:25

## 2019-09-05 RX ADMIN — ONDANSETRON 4 MILLIGRAM(S): 8 TABLET, FILM COATED ORAL at 17:20

## 2019-09-05 NOTE — ED STATDOCS - ATTENDING CONTRIBUTION TO CARE
I, Gino Link MD,  performed the initial face to face bedside interview with this patient regarding history of present illness, review of symptoms and relevant past medical, social and family history.  I completed an independent physical examination.  I was the initial provider who evaluated this patient. I have signed out the follow up of any pending tests (i.e. labs, radiological studies) to the ACP.  I have communicated the patient’s plan of care and disposition with the ACP.

## 2019-09-05 NOTE — ED STATDOCS - NS_ ATTENDINGSCRIBEDETAILS _ED_A_ED_FT
I, Gino Link MD,  performed the initial face to face bedside interview with this patient regarding history of present illness, review of symptoms and relevant past medical, social and family history.  I completed an independent physical examination.    The history, relevant review of systems, past medical and surgical history, medical decision making, and physical examination was documented by the scribe in my presence and I attest to the accuracy of the documentation.

## 2019-09-05 NOTE — ED STATDOCS - PROGRESS NOTE DETAILS
Patient feeling better. Results of Lab work and CT abd/pelvis reviewed and discussed with patient. Instructed to F/u with Urologist.

## 2019-09-05 NOTE — ED ADULT TRIAGE NOTE - CHIEF COMPLAINT QUOTE
Patient presents complaining of flank pain and hematuria. Patient was seen here on Monday and diagnosed with kidney stones. Pain subsided then on Tuesday started again.

## 2019-09-05 NOTE — ED STATDOCS - PATIENT PORTAL LINK FT
You can access the FollowMyHealth Patient Portal offered by [facility] by registering at the following website: [email or web address]. By joining [employer]’s FollowMyHealth portal, you will also be able to view your health information using other applications (apps) compatible with our system.

## 2019-09-05 NOTE — ED STATDOCS - OBJECTIVE STATEMENT
32 y/o male with a PMHx of kidney stones, gastric sleeve, acid reflux, obesity, sleep apnea, vitamin D presents to the ED c/o left sided flank pain and hematuria starting 3 days ago. Pt was at [facility] two weeks ago and was told he had kidney stones.

## 2019-09-05 NOTE — ED ADULT NURSE NOTE - OBJECTIVE STATEMENT
Pt complains of flank pain and hematuria starting at 9 a.m., reports being told of kidney stones 1-2 weeks ago.

## 2019-09-06 ENCOUNTER — TRANSCRIPTION ENCOUNTER (OUTPATIENT)
Age: 34
End: 2019-09-06

## 2019-09-06 LAB
CULTURE RESULTS: SIGNIFICANT CHANGE UP
SPECIMEN SOURCE: SIGNIFICANT CHANGE UP

## 2019-09-09 ENCOUNTER — APPOINTMENT (OUTPATIENT)
Dept: SURGERY | Facility: CLINIC | Age: 34
End: 2019-09-09
Payer: COMMERCIAL

## 2019-09-09 VITALS
SYSTOLIC BLOOD PRESSURE: 106 MMHG | WEIGHT: 279 LBS | OXYGEN SATURATION: 98 % | BODY MASS INDEX: 39.94 KG/M2 | RESPIRATION RATE: 16 BRPM | TEMPERATURE: 97.8 F | HEIGHT: 70 IN | DIASTOLIC BLOOD PRESSURE: 73 MMHG | HEART RATE: 74 BPM

## 2019-09-09 DIAGNOSIS — Z09 ENCOUNTER FOR FOLLOW-UP EXAMINATION AFTER COMPLETED TREATMENT FOR CONDITIONS OTHER THAN MALIGNANT NEOPLASM: ICD-10-CM

## 2019-09-09 DIAGNOSIS — Z01.818 ENCOUNTER FOR OTHER PREPROCEDURAL EXAMINATION: ICD-10-CM

## 2019-09-09 PROCEDURE — 99024 POSTOP FOLLOW-UP VISIT: CPT

## 2019-11-13 ENCOUNTER — EMERGENCY (EMERGENCY)
Facility: HOSPITAL | Age: 34
LOS: 1 days | Discharge: HOME | End: 2019-11-13
Attending: EMERGENCY MEDICINE
Payer: COMMERCIAL

## 2019-11-13 VITALS
DIASTOLIC BLOOD PRESSURE: 80 MMHG | HEART RATE: 84 BPM | TEMPERATURE: 98 F | HEIGHT: 70 IN | SYSTOLIC BLOOD PRESSURE: 124 MMHG | RESPIRATION RATE: 18 BRPM | WEIGHT: 235.01 LBS | OXYGEN SATURATION: 100 %

## 2019-11-13 DIAGNOSIS — Z94.7 CORNEAL TRANSPLANT STATUS: Chronic | ICD-10-CM

## 2019-11-13 PROCEDURE — 99284 EMERGENCY DEPT VISIT MOD MDM: CPT

## 2019-11-13 RX ORDER — ONDANSETRON 8 MG/1
4 TABLET, FILM COATED ORAL ONCE
Refills: 0 | Status: COMPLETED | OUTPATIENT
Start: 2019-11-13 | End: 2019-11-13

## 2019-11-13 RX ORDER — SODIUM CHLORIDE 9 MG/ML
1000 INJECTION INTRAMUSCULAR; INTRAVENOUS; SUBCUTANEOUS ONCE
Refills: 0 | Status: COMPLETED | OUTPATIENT
Start: 2019-11-13 | End: 2019-11-13

## 2019-11-13 RX ORDER — KETOROLAC TROMETHAMINE 30 MG/ML
30 SYRINGE (ML) INJECTION ONCE
Refills: 0 | Status: DISCONTINUED | OUTPATIENT
Start: 2019-11-13 | End: 2019-11-13

## 2019-11-13 RX ADMIN — ONDANSETRON 4 MILLIGRAM(S): 8 TABLET, FILM COATED ORAL at 23:55

## 2019-11-13 RX ADMIN — Medication 30 MILLIGRAM(S): at 23:53

## 2019-11-13 NOTE — ED PROVIDER NOTE - PROGRESS NOTE DETAILS
Stevie PGY-1: Left message with Dr. Hayden at [phone] Stevie PGY-1: patient passing lots of gas since rectal, currently without nausea and pain after zofran and toradol. Stevie PGY-1: patient passing lots of gas since rectal, currently without nausea and pain after zofran and toradol Stevie PGY-1: left another message with Dr. Hayden at [phone] Stevie PGY-1: patient had large bowel movement after magnesium citrate and enema, continues to feel better, awaiting callback from bariatric team Stevie PGY-1: patient had large bowel movement after magnesium citrate and enema, continues to feel better, bariatric team says no need to see patient as there is an alternative diagnosis Stevie PGY-1: patient's pain still improved, no vomiting here in ED, tolerating PO and ready for DC. Discussed return precautions, importance of follow up and urology referral Stevie PGY-1: patient's pain still improved, no vomiting here in ED, tolerating PO and ready for DC. Discussed return precautions, importance of follow up and urology referral. Patient provided strainer for urine

## 2019-11-13 NOTE — ED PROVIDER NOTE - NS ED ROS FT
ROS:  GENERAL: No fever, no chills  EYES: no change in vision  HEENT: no trouble swallowing, no trouble speaking  CARDIAC: no chest pain  PULMONARY: no cough, no shortness of breath  GI: left flank pain, nausea, vomiting, no diarrhea, no constipation  : No dysuria, no frequency, no change in appearance, or odor of urine  SKIN: no rashes  NEURO: no headache, no weakness  MSK: No joint pain

## 2019-11-13 NOTE — ED PROVIDER NOTE - PHYSICAL EXAMINATION
Physical Exam:  Gen: obese, NAD, AOx3, non-toxic appearing, able to ambulate without assistance  Head: NCAT  HEENT: EOMI, PEERLA, normal conjunctiva, tongue midline, oral mucosa moist  Lung: CTAB, no respiratory distress, no wheezes/rhonchi/rales B/L, speaking in full sentences  CV: RRR, no murmurs, rubs or gallops  Abd: left flank TTP, soft, NT, ND, questionable left CVA TTP, no guarding, no rigidity, no rebound tenderness  Rectal: no stool in the vault, no blood  MSK: no visible deformities, ROM normal in UE/LE, no back pain  Neuro: No focal sensory or motor deficits  Skin: Warm, well perfused, no rash, no leg swelling  Psych: normal affect, calm

## 2019-11-13 NOTE — ED PROVIDER NOTE - CLINICAL SUMMARY MEDICAL DECISION MAKING FREE TEXT BOX
34yo male gastric sleeve 5 months ago, h/o neprholithiasis p/w 1 day of left flank pain and N/V and last BM 2 days ago w/o passing gas concerning for SBO vs. nephrolithiasis vs diverticulitis. Will get labs, CT, give nausea and pain meds and reevaluate.

## 2019-11-13 NOTE — ED PROVIDER NOTE - ATTENDING CONTRIBUTION TO CARE
MD Hogan:  patient seen and evaluated personally.   I agree with the History & Physical,  Impression & Plan other than what was detailed in my note.  MD Hogan    34 y/o m hx of sleeve gastrectomy in july, no other abd surgeries presenting w/ llq pain started two days ago, now worsening, associated w/ vomiting, nbnb, also reporting pos constipation, last bm last week, has been taking miralax, no softners. afebrle vitals stable, appears uncomfortable, large habitus, no actual ttp on my exam. cbc, cmp, lipase, urine, ct abd/pelvis w/ iv po. bariatric consult as per policy. pain meds anti emetics

## 2019-11-13 NOTE — ED PROVIDER NOTE - NSFOLLOWUPINSTRUCTIONS_ED_ALL_ED_FT
0 1. YOU WILL BE CALLED FOR A UROLOGY FOLLOW UP APPOINTMENT IN THE NEXT 1-2 DAYS  2. FOR PAIN OR FEVER YOU CAN TAKE IBUPROFEN (MOTRIN, ADVIL) OR ACETAMINOPHEN (TYLENOL) AS NEEDED, AS DIRECTED ON PACKAGING.  3. FOLLOW UP WITH YOUR PRIMARY DOCTOR WITHIN 2-3 DAYS  4. IF YOU HAD LABS OR IMAGING DONE, YOU WERE GIVEN COPIES OF ALL LABS AND/OR IMAGING RESULTS FROM YOUR ER VISIT--PLEASE TAKE THEM WITH YOU TO YOUR FOLLOW UP APPOINTMENTS.  5. IF NEEDED, CALL PATIENT ACCESS SERVICES AT [phone] TO FIND A PRIMARY CARE PHYSICIAN.  OR CALL [phone] TO MAKE AN APPOINTMENT WITH THE CLINIC.  6. RETURN TO THE ER FOR ANY WORSENING SYMPTOMS OR CONCERNS.

## 2019-11-13 NOTE — ED ADULT NURSE NOTE - OBJECTIVE STATEMENT
Pt is a 33y Male c/o L abdominal pain that passes to his back. PMH Gastric sleeve surgery. Pt states the pain started at the incisional site on his L abdominal wall. Pt is bending over in pain and gagging stating "I feel like I am going to vomit". Pt is tender at the site of pain. Last BM was on Monday and Pt states that since his gastric sleeve he only has a BM once a week. Pt is a 33y Male c/o L abdominal pain that passes to his back. PMH Gastric sleeve surgery. Pt states the pain started at the incisional site on his L abdominal wall. Pt is bending over in pain and gagging stating "I feel like I am going to vomit". Pt is tender at the site of pain. Last BM was on Monday and Pt states that since his gastric sleeve he only has a BM once a week. Pt's GI has told the pt to take Miralax and Pt has not been compliant.

## 2019-11-13 NOTE — ED PROVIDER NOTE - PATIENT PORTAL LINK FT
You can access the FollowMyHealth Patient Portal offered by [facility] by registering at the following website: [email or web address]. By joining [facility]’s FollowMyHealth portal, you will also be able to view your health information using other applications (apps) compatible with our system.

## 2019-11-13 NOTE — ED PROVIDER NOTE - OBJECTIVE STATEMENT
32yo male gastric sleeve 5 months ago p/w 1 day of left flank pain and N/V. The pain started this morning around the left side of the abdomen and he vomited twice. He has also had some dysuria. He has had kidney stones in the past but this pain feels different. He has not had a BM in 2 days and is not passing gas. He has had a BM 1x per weeks since the surgery. He was told to use miralax every day but has not. Denies chest pain, dyspnea, diarrhea, fever.

## 2019-11-14 VITALS
HEART RATE: 76 BPM | DIASTOLIC BLOOD PRESSURE: 63 MMHG | SYSTOLIC BLOOD PRESSURE: 104 MMHG | TEMPERATURE: 98 F | RESPIRATION RATE: 18 BRPM | OXYGEN SATURATION: 100 %

## 2019-11-14 DIAGNOSIS — Z98.890 OTHER SPECIFIED POSTPROCEDURAL STATES: Chronic | ICD-10-CM

## 2019-11-14 LAB
ALBUMIN SERPL ELPH-MCNC: 4.5 G/DL — SIGNIFICANT CHANGE UP (ref 3.3–5)
ALP SERPL-CCNC: 75 U/L — SIGNIFICANT CHANGE UP (ref 40–120)
ALT FLD-CCNC: 48 U/L — HIGH (ref 10–45)
ANION GAP SERPL CALC-SCNC: 19 MMOL/L — HIGH (ref 5–17)
APPEARANCE UR: CLEAR — SIGNIFICANT CHANGE UP
APTT BLD: 31 SEC — SIGNIFICANT CHANGE UP (ref 27.5–36.3)
AST SERPL-CCNC: 42 U/L — HIGH (ref 10–40)
BACTERIA # UR AUTO: NEGATIVE — SIGNIFICANT CHANGE UP
BASOPHILS # BLD AUTO: 0 K/UL — SIGNIFICANT CHANGE UP (ref 0–0.2)
BASOPHILS NFR BLD AUTO: 0 % — SIGNIFICANT CHANGE UP (ref 0–2)
BILIRUB SERPL-MCNC: 1 MG/DL — SIGNIFICANT CHANGE UP (ref 0.2–1.2)
BILIRUB UR-MCNC: ABNORMAL
BLD GP AB SCN SERPL QL: NEGATIVE — SIGNIFICANT CHANGE UP
BUN SERPL-MCNC: 8 MG/DL — SIGNIFICANT CHANGE UP (ref 7–23)
CALCIUM SERPL-MCNC: 10.4 MG/DL — SIGNIFICANT CHANGE UP (ref 8.4–10.5)
CHLORIDE SERPL-SCNC: 95 MMOL/L — LOW (ref 96–108)
CO2 SERPL-SCNC: 24 MMOL/L — SIGNIFICANT CHANGE UP (ref 22–31)
COLOR SPEC: YELLOW — SIGNIFICANT CHANGE UP
CREAT SERPL-MCNC: 0.92 MG/DL — SIGNIFICANT CHANGE UP (ref 0.5–1.3)
DIFF PNL FLD: ABNORMAL
EOSINOPHIL # BLD AUTO: 0.26 K/UL — SIGNIFICANT CHANGE UP (ref 0–0.5)
EOSINOPHIL NFR BLD AUTO: 2 % — SIGNIFICANT CHANGE UP (ref 0–6)
EPI CELLS # UR: 0 /HPF — SIGNIFICANT CHANGE UP
GLUCOSE SERPL-MCNC: 90 MG/DL — SIGNIFICANT CHANGE UP (ref 70–99)
GLUCOSE UR QL: NEGATIVE — SIGNIFICANT CHANGE UP
HCT VFR BLD CALC: 45.5 % — SIGNIFICANT CHANGE UP (ref 39–50)
HGB BLD-MCNC: 15.1 G/DL — SIGNIFICANT CHANGE UP (ref 13–17)
HYALINE CASTS # UR AUTO: 2 /LPF — SIGNIFICANT CHANGE UP (ref 0–2)
INR BLD: 1.16 RATIO — SIGNIFICANT CHANGE UP (ref 0.88–1.16)
KETONES UR-MCNC: ABNORMAL
LEUKOCYTE ESTERASE UR-ACNC: NEGATIVE — SIGNIFICANT CHANGE UP
LIDOCAIN IGE QN: 34 U/L — SIGNIFICANT CHANGE UP (ref 7–60)
LYMPHOCYTES # BLD AUTO: 16 % — SIGNIFICANT CHANGE UP (ref 13–44)
LYMPHOCYTES # BLD AUTO: 2.07 K/UL — SIGNIFICANT CHANGE UP (ref 1–3.3)
MANUAL SMEAR VERIFICATION: SIGNIFICANT CHANGE UP
MCHC RBC-ENTMCNC: 29.6 PG — SIGNIFICANT CHANGE UP (ref 27–34)
MCHC RBC-ENTMCNC: 33.2 GM/DL — SIGNIFICANT CHANGE UP (ref 32–36)
MCV RBC AUTO: 89.2 FL — SIGNIFICANT CHANGE UP (ref 80–100)
MONOCYTES # BLD AUTO: 1.81 K/UL — HIGH (ref 0–0.9)
MONOCYTES NFR BLD AUTO: 14 % — SIGNIFICANT CHANGE UP (ref 2–14)
NEUTROPHILS # BLD AUTO: 8.81 K/UL — HIGH (ref 1.8–7.4)
NEUTROPHILS NFR BLD AUTO: 66 % — SIGNIFICANT CHANGE UP (ref 43–77)
NEUTS BAND # BLD: 2 % — SIGNIFICANT CHANGE UP (ref 0–8)
NITRITE UR-MCNC: NEGATIVE — SIGNIFICANT CHANGE UP
NRBC # BLD: 0 /100 — SIGNIFICANT CHANGE UP (ref 0–0)
PH UR: 6.5 — SIGNIFICANT CHANGE UP (ref 5–8)
PLAT MORPH BLD: NORMAL — SIGNIFICANT CHANGE UP
PLATELET # BLD AUTO: 296 K/UL — SIGNIFICANT CHANGE UP (ref 150–400)
POTASSIUM SERPL-MCNC: 3.7 MMOL/L — SIGNIFICANT CHANGE UP (ref 3.5–5.3)
POTASSIUM SERPL-SCNC: 3.7 MMOL/L — SIGNIFICANT CHANGE UP (ref 3.5–5.3)
PROT SERPL-MCNC: 7.6 G/DL — SIGNIFICANT CHANGE UP (ref 6–8.3)
PROT UR-MCNC: ABNORMAL
PROTHROM AB SERPL-ACNC: 13.3 SEC — HIGH (ref 10–12.9)
RBC # BLD: 5.1 M/UL — SIGNIFICANT CHANGE UP (ref 4.2–5.8)
RBC # FLD: 14.3 % — SIGNIFICANT CHANGE UP (ref 10.3–14.5)
RBC BLD AUTO: SIGNIFICANT CHANGE UP
RBC CASTS # UR COMP ASSIST: 51 /HPF — HIGH (ref 0–4)
RH IG SCN BLD-IMP: POSITIVE — SIGNIFICANT CHANGE UP
SODIUM SERPL-SCNC: 138 MMOL/L — SIGNIFICANT CHANGE UP (ref 135–145)
SP GR SPEC: >1.05 (ref 1.01–1.02)
UROBILINOGEN FLD QL: ABNORMAL
WBC # BLD: 12.95 K/UL — HIGH (ref 3.8–10.5)
WBC # FLD AUTO: 12.95 K/UL — HIGH (ref 3.8–10.5)
WBC UR QL: 4 /HPF — SIGNIFICANT CHANGE UP (ref 0–5)

## 2019-11-14 PROCEDURE — 74177 CT ABD & PELVIS W/CONTRAST: CPT | Mod: 26

## 2019-11-14 PROCEDURE — 80053 COMPREHEN METABOLIC PANEL: CPT

## 2019-11-14 PROCEDURE — 99284 EMERGENCY DEPT VISIT MOD MDM: CPT | Mod: 25

## 2019-11-14 PROCEDURE — 96374 THER/PROPH/DIAG INJ IV PUSH: CPT | Mod: XU

## 2019-11-14 PROCEDURE — 96376 TX/PRO/DX INJ SAME DRUG ADON: CPT

## 2019-11-14 PROCEDURE — 74177 CT ABD & PELVIS W/CONTRAST: CPT

## 2019-11-14 PROCEDURE — 86900 BLOOD TYPING SEROLOGIC ABO: CPT

## 2019-11-14 PROCEDURE — 85730 THROMBOPLASTIN TIME PARTIAL: CPT

## 2019-11-14 PROCEDURE — 96375 TX/PRO/DX INJ NEW DRUG ADDON: CPT

## 2019-11-14 PROCEDURE — 96361 HYDRATE IV INFUSION ADD-ON: CPT

## 2019-11-14 PROCEDURE — 86901 BLOOD TYPING SEROLOGIC RH(D): CPT

## 2019-11-14 PROCEDURE — 85027 COMPLETE CBC AUTOMATED: CPT

## 2019-11-14 PROCEDURE — 86850 RBC ANTIBODY SCREEN: CPT

## 2019-11-14 PROCEDURE — 85610 PROTHROMBIN TIME: CPT

## 2019-11-14 PROCEDURE — 83690 ASSAY OF LIPASE: CPT

## 2019-11-14 PROCEDURE — 81001 URINALYSIS AUTO W/SCOPE: CPT

## 2019-11-14 RX ORDER — SODIUM CHLORIDE 9 MG/ML
1000 INJECTION INTRAMUSCULAR; INTRAVENOUS; SUBCUTANEOUS ONCE
Refills: 0 | Status: COMPLETED | OUTPATIENT
Start: 2019-11-14 | End: 2019-11-14

## 2019-11-14 RX ORDER — ONDANSETRON 8 MG/1
4 TABLET, FILM COATED ORAL ONCE
Refills: 0 | Status: COMPLETED | OUTPATIENT
Start: 2019-11-14 | End: 2019-11-14

## 2019-11-14 RX ORDER — IBUPROFEN 200 MG
600 TABLET ORAL ONCE
Refills: 0 | Status: COMPLETED | OUTPATIENT
Start: 2019-11-14 | End: 2019-11-14

## 2019-11-14 RX ORDER — ACETAMINOPHEN 500 MG
975 TABLET ORAL ONCE
Refills: 0 | Status: COMPLETED | OUTPATIENT
Start: 2019-11-14 | End: 2019-11-14

## 2019-11-14 RX ORDER — MULTIVIT WITH MIN/MFOLATE/K2 340-15/3 G
1 POWDER (GRAM) ORAL ONCE
Refills: 0 | Status: COMPLETED | OUTPATIENT
Start: 2019-11-14 | End: 2019-11-14

## 2019-11-14 RX ADMIN — SODIUM CHLORIDE 1000 MILLILITER(S): 9 INJECTION INTRAMUSCULAR; INTRAVENOUS; SUBCUTANEOUS at 01:15

## 2019-11-14 RX ADMIN — SODIUM CHLORIDE 1000 MILLILITER(S): 9 INJECTION INTRAMUSCULAR; INTRAVENOUS; SUBCUTANEOUS at 01:33

## 2019-11-14 RX ADMIN — Medication 1 BOTTLE: at 02:19

## 2019-11-14 RX ADMIN — Medication 30 MILLIGRAM(S): at 00:31

## 2019-11-14 RX ADMIN — ONDANSETRON 4 MILLIGRAM(S): 8 TABLET, FILM COATED ORAL at 01:30

## 2019-11-14 RX ADMIN — SODIUM CHLORIDE 1000 MILLILITER(S): 9 INJECTION INTRAMUSCULAR; INTRAVENOUS; SUBCUTANEOUS at 00:19

## 2019-11-14 RX ADMIN — Medication 600 MILLIGRAM(S): at 03:45

## 2019-11-14 RX ADMIN — Medication 975 MILLIGRAM(S): at 03:46

## 2019-11-20 ENCOUNTER — LABORATORY RESULT (OUTPATIENT)
Age: 34
End: 2019-11-20

## 2019-11-20 ENCOUNTER — APPOINTMENT (OUTPATIENT)
Dept: UROLOGY | Facility: CLINIC | Age: 34
End: 2019-11-20
Payer: COMMERCIAL

## 2019-11-20 VITALS
TEMPERATURE: 98.1 F | HEIGHT: 70 IN | WEIGHT: 236 LBS | HEART RATE: 73 BPM | BODY MASS INDEX: 33.79 KG/M2 | OXYGEN SATURATION: 99 % | RESPIRATION RATE: 16 BRPM | DIASTOLIC BLOOD PRESSURE: 69 MMHG | SYSTOLIC BLOOD PRESSURE: 100 MMHG

## 2019-11-20 DIAGNOSIS — N20.1 CALCULUS OF URETER: ICD-10-CM

## 2019-11-20 DIAGNOSIS — R10.9 UNSPECIFIED ABDOMINAL PAIN: ICD-10-CM

## 2019-11-20 LAB
BILIRUB UR QL STRIP: NORMAL
CLARITY UR: CLEAR
COLLECTION METHOD: NORMAL
GLUCOSE UR-MCNC: NORMAL
HCG UR QL: 1 EU/DL
HGB UR QL STRIP.AUTO: NORMAL
KETONES UR-MCNC: 15
LEUKOCYTE ESTERASE UR QL STRIP: NORMAL
NITRITE UR QL STRIP: NORMAL
PH UR STRIP: 5.5
PROT UR STRIP-MCNC: 100
SP GR UR STRIP: 1.02

## 2019-11-20 PROCEDURE — 99204 OFFICE O/P NEW MOD 45 MIN: CPT | Mod: 25

## 2019-11-20 PROCEDURE — 81003 URINALYSIS AUTO W/O SCOPE: CPT | Mod: QW

## 2019-11-20 RX ORDER — TAMSULOSIN HYDROCHLORIDE 0.4 MG/1
0.4 CAPSULE ORAL
Qty: 30 | Refills: 1 | Status: ACTIVE | COMMUNITY
Start: 2019-11-20 | End: 1900-01-01

## 2019-11-20 NOTE — REVIEW OF SYSTEMS
[see HPI] : see HPI [Eyesight Problems] : eyesight problems [Dry Eyes] : dryness of the eyes [Negative] : Heme/Lymph

## 2019-11-22 LAB
APPEARANCE: ABNORMAL
BILIRUBIN URINE: ABNORMAL
BLOOD URINE: ABNORMAL
COLOR: YELLOW
GLUCOSE QUALITATIVE U: NEGATIVE
KETONES URINE: ABNORMAL
LEUKOCYTE ESTERASE URINE: NEGATIVE
NITRITE URINE: NEGATIVE
PH URINE: 6
PROTEIN URINE: ABNORMAL
SPECIFIC GRAVITY URINE: 1.02
UROBILINOGEN URINE: ABNORMAL

## 2019-12-01 PROBLEM — R10.9 LEFT FLANK PAIN: Status: ACTIVE | Noted: 2019-12-01

## 2019-12-01 NOTE — HISTORY OF PRESENT ILLNESS
[FreeTextEntry1] : 32 yo male presents for kidney stone.\par Went to [facility] ED 1 week ago with left flank pain, vomiting and hematuria. \par Had CT scan was told has kidney stone and sent home on Pain meds. \par Did not see the stone pass. Pain has decreased but still has off and on pain. \par Denies any difficulty with urination. \par Denies dysuria, fever, chills or rigors. \par Has been to [facility] ED x 2 in Aug-Sept. Was told of kidney stone then too.\par No prior history of kidney stone, no family history.

## 2019-12-01 NOTE — LETTER BODY
[Dear  ___] : Dear  [unfilled], [Consult Letter:] : I had the pleasure of evaluating your patient, [unfilled]. [( Thank you for referring [unfilled] for consultation for _____ )] : Thank you for referring [unfilled] for consultation for [unfilled] [Please see my note below.] : Please see my note below. [Consult Closing:] : Thank you very much for allowing me to participate in the care of this patient.  If you have any questions, please do not hesitate to contact me. [Sincerely,] : Sincerely, [FreeTextEntry3] : Nash Briones MD\par  of Urology\par [facility]\par \par Offices:\par [facility] @\par [address], [place] [address]\par and\par [address], [place] [address], [address]\par and\par [address] [address]\par \par TEL: [phone]\par FAX: [phone]

## 2019-12-01 NOTE — PHYSICAL EXAM
[General Appearance - Well Developed] : well developed [Normal Appearance] : normal appearance [General Appearance - In No Acute Distress] : no acute distress [] : no respiratory distress [Abdomen Soft] : soft [Abdomen Tenderness] : non-tender [Abdomen Mass (___ Cm)] : no abdominal mass palpated [Costovertebral Angle Tenderness] : no ~M costovertebral angle tenderness [Urethral Meatus] : meatus normal [Penis Abnormality] : normal circumcised penis [Scrotum] : the scrotum was normal [Epididymis] : the epididymides were normal [Testes Tenderness] : no tenderness of the testes [Testes Mass (___cm)] : there were no testicular masses [Normal Station and Gait] : the gait and station were normal for the patient's age [Skin Color & Pigmentation] : normal skin color and pigmentation [No Focal Deficits] : no focal deficits [Oriented To Time, Place, And Person] : oriented to person, place, and time [No Palpable Adenopathy] : no palpable adenopathy [FreeTextEntry1] : normal peripheral circulation

## 2019-12-01 NOTE — ASSESSMENT
[FreeTextEntry1] : Left flank pain:\par Ureteral stone:\par 4 mm left distal ureteral stone. Discussed concern he has had the stone since Aug-Sept.\par Discussed the options of continued medical management Vs Ureteroscopy Vs Shock wave lithotripsy.\par Risks and benefits of each modality were discussed. \par Pt wants to continue medical management. \par Good oral hydration\par Continue Flomax\par PRN pain medication\par Strain urine\par Call for fevers, chill, severe nausea and vomiting, or severe pain or go to ER for worsening of medical condition may need urgent ureteral stent placement. \par Renal and Bladder Ultrasound before follow up appointment.\par Will get Urinalysis with reflex Urine culture. \par \par Return to office in 3 weeks or sooner if any issues.

## 2019-12-04 ENCOUNTER — APPOINTMENT (OUTPATIENT)
Dept: ULTRASOUND IMAGING | Facility: CLINIC | Age: 34
End: 2019-12-04

## 2019-12-06 ENCOUNTER — APPOINTMENT (OUTPATIENT)
Dept: UROLOGY | Facility: CLINIC | Age: 34
End: 2019-12-06

## 2019-12-09 ENCOUNTER — APPOINTMENT (OUTPATIENT)
Dept: SURGERY | Facility: CLINIC | Age: 34
End: 2019-12-09
Payer: COMMERCIAL

## 2019-12-09 VITALS
TEMPERATURE: 98 F | RESPIRATION RATE: 16 BRPM | OXYGEN SATURATION: 99 % | HEIGHT: 70 IN | WEIGHT: 216.8 LBS | HEART RATE: 85 BPM | SYSTOLIC BLOOD PRESSURE: 117 MMHG | DIASTOLIC BLOOD PRESSURE: 71 MMHG | BODY MASS INDEX: 31.04 KG/M2

## 2019-12-09 DIAGNOSIS — Z87.898 PERSONAL HISTORY OF OTHER SPECIFIED CONDITIONS: ICD-10-CM

## 2019-12-09 PROCEDURE — 99214 OFFICE O/P EST MOD 30 MIN: CPT

## 2019-12-09 RX ORDER — MULTIVITAMIN
CAPSULE ORAL
Refills: 0 | Status: ACTIVE | COMMUNITY

## 2019-12-09 RX ORDER — CHROMIUM 200 MCG
TABLET ORAL
Refills: 0 | Status: ACTIVE | COMMUNITY

## 2019-12-09 RX ORDER — BIOTIN 10 MG
TABLET ORAL
Refills: 0 | Status: ACTIVE | COMMUNITY

## 2019-12-09 RX ORDER — PNV NO.95/FERROUS FUM/FOLIC AC 28MG-0.8MG
TABLET ORAL
Refills: 0 | Status: ACTIVE | COMMUNITY

## 2020-04-17 ENCOUNTER — APPOINTMENT (OUTPATIENT)
Dept: SURGERY | Facility: CLINIC | Age: 35
End: 2020-04-17
Payer: COMMERCIAL

## 2020-04-17 DIAGNOSIS — E66.9 OBESITY, UNSPECIFIED: ICD-10-CM

## 2020-04-17 PROCEDURE — 99214 OFFICE O/P EST MOD 30 MIN: CPT | Mod: 95

## 2020-04-17 NOTE — REASON FOR VISIT
[Follow-Up Visit] : a follow-up visit for [Morbid Obesity (BMI>40)] : morbid obesity (bmi>40) [FreeTextEntry2] : Weight loss follow-up

## 2020-04-17 NOTE — ASSESSMENT
[FreeTextEntry1] : 34-year-old male status post sleeve gastrectomy he currently is doing very well has lost a significant amount of weight he is 5 foot 10 170 pounds.  He should start to plateau somewhere in the neighborhood of 160 pounds I have asked him to come back to the office or call in the next 2 months.  Otherwise he is doing very well he has no diarrhea or abdominal pain nausea or vomiting.  All of his questions were answered

## 2020-04-17 NOTE — HISTORY OF PRESENT ILLNESS
[Home] : at home, [unfilled] , at the time of the visit. [Medical Office: ([facility])___] : at ~his/her~ medical office located in [place] [Patient] : the patient [Self] : self [de-identified] : Perry is a 33 y/o male being evaluated for a follow-up visit. Patient is s/p laparoscopic vertical sleeve gastrectomy on 07/09/2019. Patient was last seen on 12/09/2019.  34-year-old male status post sleeve gastrectomy he currently is doing very well denies nausea vomiting fevers chills or abdominal pain and has only occasional reflux which he takes antacids for.  He is exercising as well as he can his current weight is 170 pounds his starting weight was 330 pounds.  He is tolerating a variety of foods and is taking his multivitamins.

## 2020-05-05 ENCOUNTER — LABORATORY RESULT (OUTPATIENT)
Age: 35
End: 2020-05-05

## 2020-05-06 ENCOUNTER — LABORATORY RESULT (OUTPATIENT)
Age: 35
End: 2020-05-06

## 2020-05-06 ENCOUNTER — APPOINTMENT (OUTPATIENT)
Dept: UROLOGY | Facility: CLINIC | Age: 35
End: 2020-05-06
Payer: COMMERCIAL

## 2020-05-06 VITALS
TEMPERATURE: 98.2 F | OXYGEN SATURATION: 98 % | HEIGHT: 70 IN | BODY MASS INDEX: 24.34 KG/M2 | SYSTOLIC BLOOD PRESSURE: 106 MMHG | WEIGHT: 170 LBS | HEART RATE: 64 BPM | DIASTOLIC BLOOD PRESSURE: 69 MMHG

## 2020-05-06 DIAGNOSIS — R10.9 UNSPECIFIED ABDOMINAL PAIN: ICD-10-CM

## 2020-05-06 DIAGNOSIS — Z87.442 PERSONAL HISTORY OF URINARY CALCULI: ICD-10-CM

## 2020-05-06 LAB
BILIRUB UR QL STRIP: NORMAL
CLARITY UR: CLEAR
COLLECTION METHOD: NORMAL
GLUCOSE UR-MCNC: NORMAL
HCG UR QL: 0.2 EU/DL
HGB UR QL STRIP.AUTO: NORMAL
KETONES UR-MCNC: NORMAL
LEUKOCYTE ESTERASE UR QL STRIP: NORMAL
NITRITE UR QL STRIP: NORMAL
PH UR STRIP: 5.5
PROT UR STRIP-MCNC: 100
SP GR UR STRIP: 1.02

## 2020-05-06 PROCEDURE — 99214 OFFICE O/P EST MOD 30 MIN: CPT | Mod: 25

## 2020-05-06 PROCEDURE — 81003 URINALYSIS AUTO W/O SCOPE: CPT | Mod: QW

## 2020-05-06 RX ORDER — OXYCODONE AND ACETAMINOPHEN 5; 325 MG/1; MG/1
5-325 TABLET ORAL
Qty: 15 | Refills: 0 | Status: ACTIVE | COMMUNITY
Start: 2020-05-06 | End: 1900-01-01

## 2020-05-06 RX ORDER — TAMSULOSIN HYDROCHLORIDE 0.4 MG/1
0.4 CAPSULE ORAL
Qty: 30 | Refills: 0 | Status: ACTIVE | COMMUNITY
Start: 2020-05-06 | End: 1900-01-01

## 2020-05-06 NOTE — ASSESSMENT
[FreeTextEntry1] : Right flank pain:\par History of kidney stone:\par Discussed concern that Pt probably passing right side kidney stone. \par Will get Urinalysis with reflex Urine culture. \par Stat CT scan A&P. Will inform results. \par Start Flomax. \par PRN pain medicine. \par \par Recommended that patient report to Emergency department if condition worsens- develops high grade fever, persistent nausea and vomiting, uncontrolled pain.\par

## 2020-05-06 NOTE — PHYSICAL EXAM
[General Appearance - Well Developed] : well developed [Normal Appearance] : normal appearance [Abdomen Soft] : soft [Abdomen Tenderness] : non-tender [Abdomen Mass (___ Cm)] : no abdominal mass palpated [Skin Color & Pigmentation] : normal skin color and pigmentation [FreeTextEntry1] : normal peripheral circulation  [] : no respiratory distress [Oriented To Time, Place, And Person] : oriented to person, place, and time [Normal Station and Gait] : the gait and station were normal for the patient's age [No Focal Deficits] : no focal deficits

## 2020-05-06 NOTE — HISTORY OF PRESENT ILLNESS
[FreeTextEntry1] : 35 yo male presents for right flank pain. \par Started having severe right flank pain radiating to front last night, 10/10, little relief with Tylenol ES. \par Still has 5/10. Noticed urine to be darker. \par Denies dysuria, fever, chills or rigors. \par \par Was doing well up until now since last visit. \par Did pass the left kidney stone, was not able to catch it. \par \par Initially seen for for kidney stone.\par Went to [facility] ED with left flank pain, vomiting and hematuria. \par Had CT scan was told has kidney stone and sent home on Pain meds. \par Did not see the stone pass. Pain had decreased but still has off and on pain. \par Denied any difficulty with urination. \par Denied dysuria, fever, chills or rigors. \par Has been to [facility] ED x 2 in Aug-Sept. Was told of kidney stone then too.\par No prior history of kidney stone, no family history.

## 2020-05-07 ENCOUNTER — APPOINTMENT (OUTPATIENT)
Dept: CT IMAGING | Facility: CLINIC | Age: 35
End: 2020-05-07
Payer: COMMERCIAL

## 2020-05-07 ENCOUNTER — RESULT REVIEW (OUTPATIENT)
Age: 35
End: 2020-05-07

## 2020-05-07 ENCOUNTER — OUTPATIENT (OUTPATIENT)
Dept: OUTPATIENT SERVICES | Facility: HOSPITAL | Age: 35
LOS: 1 days | End: 2020-05-07
Payer: COMMERCIAL

## 2020-05-07 DIAGNOSIS — Z00.8 ENCOUNTER FOR OTHER GENERAL EXAMINATION: ICD-10-CM

## 2020-05-07 DIAGNOSIS — Z94.7 CORNEAL TRANSPLANT STATUS: Chronic | ICD-10-CM

## 2020-05-07 DIAGNOSIS — Z98.890 OTHER SPECIFIED POSTPROCEDURAL STATES: Chronic | ICD-10-CM

## 2020-05-07 LAB
APPEARANCE: ABNORMAL
BILIRUBIN URINE: NEGATIVE
BLOOD URINE: ABNORMAL
COLOR: ABNORMAL
GLUCOSE QUALITATIVE U: NEGATIVE
KETONES URINE: NEGATIVE
LEUKOCYTE ESTERASE URINE: ABNORMAL
NITRITE URINE: NEGATIVE
PH URINE: 6
PROTEIN URINE: ABNORMAL
SPECIFIC GRAVITY URINE: 1.01
UROBILINOGEN URINE: NORMAL

## 2020-05-07 PROCEDURE — 74176 CT ABD & PELVIS W/O CONTRAST: CPT

## 2020-05-07 PROCEDURE — 74176 CT ABD & PELVIS W/O CONTRAST: CPT | Mod: 26

## 2020-05-28 ENCOUNTER — OUTPATIENT (OUTPATIENT)
Dept: OUTPATIENT SERVICES | Facility: HOSPITAL | Age: 35
LOS: 1 days | End: 2020-05-28
Payer: COMMERCIAL

## 2020-05-28 ENCOUNTER — RESULT REVIEW (OUTPATIENT)
Age: 35
End: 2020-05-28

## 2020-05-28 ENCOUNTER — APPOINTMENT (OUTPATIENT)
Dept: ULTRASOUND IMAGING | Facility: CLINIC | Age: 35
End: 2020-05-28
Payer: COMMERCIAL

## 2020-05-28 DIAGNOSIS — Z00.8 ENCOUNTER FOR OTHER GENERAL EXAMINATION: ICD-10-CM

## 2020-05-28 DIAGNOSIS — Z98.890 OTHER SPECIFIED POSTPROCEDURAL STATES: Chronic | ICD-10-CM

## 2020-05-28 DIAGNOSIS — Z94.7 CORNEAL TRANSPLANT STATUS: Chronic | ICD-10-CM

## 2020-05-28 PROCEDURE — 76770 US EXAM ABDO BACK WALL COMP: CPT | Mod: 26

## 2020-05-28 PROCEDURE — 76770 US EXAM ABDO BACK WALL COMP: CPT

## 2020-06-02 ENCOUNTER — APPOINTMENT (OUTPATIENT)
Dept: UROLOGY | Facility: CLINIC | Age: 35
End: 2020-06-02
Payer: COMMERCIAL

## 2020-06-02 PROCEDURE — 99214 OFFICE O/P EST MOD 30 MIN: CPT | Mod: 95

## 2020-06-03 NOTE — REASON FOR VISIT
[Home] : at home, [unfilled] , at the time of the visit. [Other Location: e.g. Home (Enter Location, City,State)___] : at [unfilled] [Verbal consent obtained from patient] : the patient, [unfilled] [Follow-up Visit ___] : a follow-up visit  for [unfilled]

## 2020-06-03 NOTE — PHYSICAL EXAM
[Normal Appearance] : normal appearance [General Appearance - In No Acute Distress] : no acute distress [] : no respiratory distress [Oriented To Time, Place, And Person] : oriented to person, place, and time

## 2020-06-03 NOTE — HISTORY OF PRESENT ILLNESS
[FreeTextEntry1] : 33 yo male for telemedicine visit for kidney stone. \par \par The patient-doctor relationship has been established in a face to face fashion via real time video/audio HIPAA compliant communication using telemedicine software. The patient's identity has been confirmed. The patient was previously emailed a copy of the telemedicine consent. They have had a chance to review and has now given verbal consent and has requested care to be assessed and treated through telemedicine and understands there maybe limitations in this process and they may need further follow up care in the office and or hospital settings.\par \par All pertinent parts of the patient PFSH (past medical, family and social histories), laboratory, radiological studies and physician notes were reviewed prior to starting the face to face portion of the telemedicine visit.  \par \par Taking Flomax, did not see stone pass. Last week had severe pain, now better. \par Has urgency. Denies dysuria, hematuria, fever, chills or rigors. \par Had Renal and Bladder Ultrasound last week. \par \par Recently seen for right flank pain. 10/10, little relief with Tylenol ES. \par \par Did pass the left kidney stone, was not able to catch it. \par \par Initially seen for for kidney stone.\par Went to [facility] ED with left flank pain, vomiting and hematuria. \par Had CT scan was told has kidney stone and sent home on Pain meds. \par Did not see the stone pass. Pain had decreased but still has off and on pain. \par Denied any difficulty with urination. \par Denied dysuria, fever, chills or rigors. \par Has been to [facility] ED x 2 in Aug-Sept. Was told of kidney stone then too.\par No prior history of kidney stone, no family history.

## 2020-06-03 NOTE — ASSESSMENT
[FreeTextEntry1] : Ureteral stone:\par Discussed Renal and Bladder Ultrasound. \par Discussed seems still passing ureteral stone. \par Discussed treatment options. \par Wants to continue medical therapy. \par \par Will get repeat Renal and Bladder Ultrasound in 3 weeks. \par \par Will schedule telemedicine follow up appointment in 3 weeks. \par Return to office if any issues. \par \par I discussed the assessment and treatment plan with the patient. The patient was provided an opportunity to ask questions and all were answered. The patient agreed with the plan and demonstrated an understanding of the instructions.\par \par The patient was advised to call back or seek an in-person evaluation if the symptoms worsen or if the condition fails to improve as anticipated.

## 2020-06-10 LAB — NIDUS STONE QN: NORMAL

## 2020-06-19 ENCOUNTER — APPOINTMENT (OUTPATIENT)
Dept: ULTRASOUND IMAGING | Facility: CLINIC | Age: 35
End: 2020-06-19
Payer: COMMERCIAL

## 2020-06-19 ENCOUNTER — OUTPATIENT (OUTPATIENT)
Dept: OUTPATIENT SERVICES | Facility: HOSPITAL | Age: 35
LOS: 1 days | End: 2020-06-19
Payer: COMMERCIAL

## 2020-06-19 DIAGNOSIS — Z94.7 CORNEAL TRANSPLANT STATUS: Chronic | ICD-10-CM

## 2020-06-19 DIAGNOSIS — Z98.890 OTHER SPECIFIED POSTPROCEDURAL STATES: Chronic | ICD-10-CM

## 2020-06-19 DIAGNOSIS — Z00.8 ENCOUNTER FOR OTHER GENERAL EXAMINATION: ICD-10-CM

## 2020-06-19 PROCEDURE — 76770 US EXAM ABDO BACK WALL COMP: CPT

## 2020-06-19 PROCEDURE — 76770 US EXAM ABDO BACK WALL COMP: CPT | Mod: 26

## 2020-06-23 ENCOUNTER — APPOINTMENT (OUTPATIENT)
Dept: UROLOGY | Facility: CLINIC | Age: 35
End: 2020-06-23
Payer: COMMERCIAL

## 2020-06-23 PROCEDURE — 99214 OFFICE O/P EST MOD 30 MIN: CPT | Mod: 95

## 2020-06-23 NOTE — HISTORY OF PRESENT ILLNESS
[FreeTextEntry1] : 33 yo male for telemedicine visit for follow up.\par \par The patient-doctor relationship has been established in a face to face fashion via real time video/audio HIPAA compliant communication using telemedicine software. The patient's identity has been confirmed. The patient was previously emailed a copy of the telemedicine consent. They have had a chance to review and has now given verbal consent and has requested care to be assessed and treated through telemedicine and understands there maybe limitations in this process and they may need further follow up care in the office and or hospital settings.\par \par All pertinent parts of the patient PFSH (past medical, family and social histories), laboratory, radiological studies and physician notes were reviewed prior to starting the face to face portion of the telemedicine visit.  \par \par Passed the stone. No longer has pain. \par \par Recently seen for right flank pain. 10/10, little relief with Tylenol ES. \par \par Did pass the left kidney stone, was not able to catch it. \par \par Initially seen for for kidney stone.\par Went to [facility] ED with left flank pain, vomiting and hematuria. \par Had CT scan was told has kidney stone and sent home on Pain meds. \par Did not see the stone pass. Pain had decreased but still has off and on pain. \par Denied any difficulty with urination. \par Denied dysuria, fever, chills or rigors. \par Has been to [facility] ED x 2 in Aug-Sept. Was told of kidney stone then too.\par No prior history of kidney stone, no family history.

## 2020-06-23 NOTE — PHYSICAL EXAM
[General Appearance - In No Acute Distress] : no acute distress [Normal Appearance] : normal appearance [] : no respiratory distress [Oriented To Time, Place, And Person] : oriented to person, place, and time

## 2020-06-23 NOTE — REASON FOR VISIT
[Other Location: e.g. Home (Enter Location, City,State)___] : at [unfilled] [Verbal consent obtained from patient] : the patient, [unfilled] [Follow-up Visit ___] : a follow-up visit  for [unfilled]

## 2020-06-23 NOTE — ASSESSMENT
[FreeTextEntry1] : Ureteral stone:\par Disorder of calcium metabolism:\par Recommended Kidney stone prevention diet:\par Good oral hydration so that urine is clear to light yellow, usually 1.5 to 2 Liters of fluids, mainly water\par Increasing Citrate in diet by consuming citrus fruits and juices- zenia, limes, oranges, grapefruits and berries \par Less red meat\par Less salt\par Limit foods with oxalate like- dark green vegetables, rhubarb, chocolate, wheat bran, nuts, cranberries, and beans\par \par Will get Kidney stone metabolic work up- Ca, PTH, Uric acid and 24 Hr urine kidney stone risk profile.\par  \par Will schedule telemedicine follow up appointment in 6 weeks. Asked to call/come sooner if any issues. \par \par I discussed the assessment and treatment plan with the patient. The patient was provided an opportunity to ask questions and all were answered. The patient agreed with the plan and demonstrated an understanding of the instructions.\par \par The patient was advised to call back or seek an in-person evaluation if the symptoms worsen or if the condition fails to improve as anticipated.

## 2020-06-23 NOTE — LETTER BODY
[Dear  ___] : Dear  [unfilled], [Courtesy Letter:] : I had the pleasure of seeing your patient, [unfilled], in my office today. [Sincerely,] : Sincerely, [FreeTextEntry3] : Nash Briones MD\par  of Urology\par [facility]\par \par Offices:\par [facility] @\par [address] [place] [address]\par and\par [address], [place] [address], [address]\par and\par [address] [address]\par \par TEL: [phone]\par FAX: [phone]

## 2020-08-04 NOTE — ASU PREOP CHECKLIST - HEIGHT IN CM
OUTPATIENT CARDIOLOGY FOLLOW-UP VISIT    CHIEF COMPLAINT / REASON FOR FOLLOW-UP: AF, AS, HF, HTN, dyslipidemia, CRISSY, obesity, tobacco use, alcoholic cirrhosis    CARDIOLOGIST: [facility] - previously followed with Dr. Torres    I have reviewed and summarized all records as in problem list:  PROBLEM LIST:  1.   Paroxysmal atrial fibrillation:   a. diagnosed 09/05/2015 with no documented AF since that time   b. [id number] score = 2 (HTN, age = 1) not anticoagulated secondary to GI bleed (previously on warfarin)  2.   Aortic valve stenosis:   a. 2-D echo 07/24/2018: Trileaflet aortic valve. Moderate aortic valve stenosis, mean gradient 35 mmHg, ELENA 1.7 cm². ELENA index = 0.6 cm².  2.   Essential hypertension  3.   Dyslipidemia  4.   Obstructive sleep apnea: on CPAP  5.   Gastroesophageal reflux disease  6.   Alcoholic cirrhosis with esophageal varices  7.   History of hepatic encephalopathy  8.   History of respiratory failure  9.   Obesity  10. Current tobacco use  11. COPD    Diagnostics:  1. 2-D echo 12/22/2014: normal left ventricular systolic function. Left ventricular ejection fraction, 55 %. Mildly increased left ventricular wall thickness. Grade II/IV diastolic dysfunction, moderately elevated filling pressures. RVSP could not be calculated due to incomplete tricuspid regurgitation velocity profile. Mild mitral valve regurgitation. Mild aortic valve stenosis. Mild tricuspid valve regurgitation.  2. 2-D echo 04/28/2015: normal left ventricular systolic function. Left ventricular ejection fraction, 60 %. Mildly increased left ventricular wall thickness. Grade II/IV diastolic dysfunction, moderately elevated filling pressures. RVSP could not be calculated due to incomplete tricuspid regurgitation velocity profile. Mild mitral valve regurgitation. Mild aortic valve regurgitation. Moderate aortic valve stenosis. Mild tricuspid valve regurgitation.  3. 2-D echo 09/06/2015: normal left ventricular  systolic function. No regional wall motion abnormalities. Mildly increased left ventricular wall thickness. Normal left ventricular cavity size. Indeterminate diastolic function. Patient likely had reaction to definity contrast. Compared with prior report dated 4/28/15 patient currently in atrial fibrillation and aortic valve not evaluated on current study.  4. Echocardiogram on 06/28/2017:  LVEF 55% with mildly increased left ventricular wall thickness, grade 1 diastolic dysfunction, mild to moderate aortic valve stenosis with a mean gradient of 21.9 mmHg and trace aortic valve regurgitation.  5. 2-D echo 07/24/2018: Normal left ventricular size with no regional wall motion abnormalities. LVEF = 70 %. Mildly increased left ventricular wall thickness. Grade II/IV diastolic dysfunction, moderately elevated filling pressures. Normal right ventricular size and systolic function. PASP = 40 mmHg. Trileaflet aortic valve. Moderate aortic valve stenosis, mean gradient 35 mmHg, ELENA 1.7 cm². ELENA index = 0.6 cm².    HISTORY OF PRESENT ILLNESS:   Paulo Navarro is a 70 year old male who presents today for follow up visit, in a wheelchair and unaccompanied.     He has weekly paracentesis procedure on Friday's for liver cirrhosis ascites. He feels abdominal fullness with shortness of breath today and feels symptoms may be a bit worse than usual. He states that he usually has 10 liters removed. He denies chest pain, pressure or heaviness.  He denies palpitations, fast or irregular heart beat. He remains sedentary and is inactive. He denies pre-syncope or syncope. Appetite is fair. He notes occasional lower extremity edema bilaterally. He is sedentary and is not active. He denies PND or orthopnea. He is non-compliant with using CPAP. He is unable to weigh himself due to instability.    MEDICATIONS:  Current Outpatient Medications   Medication Sig Dispense Refill   • amitriptyline (ELAVIL) 10 MG tablet Take 3 tablets by mouth  nightly as needed for Sleep. 90 tablet 5   • torsemide (DEMADEX) 10 MG tablet 20 mg in the AM and 10 mg in the PM 90 tablet 3   • pantoprazole (PROTONIX) 40 MG tablet Take 1 tablet by mouth daily.     • Vitamin D, Ergocalciferol, 1.25 mg (50,000 units) capsule Take 1 capsule by mouth 1 day a week. 12 capsule 1   • lactulose (CHRONULAC) 10 GM/15ML solution Take 30 mLs by mouth 3 times daily. 1892 mL 1   • Ascorbic Acid (VITAMIN C) 100 MG tablet Take 100 mg by mouth daily.     • traMADol (ULTRAM) 50 MG tablet TAKE 1 TABLET BY MOUTH TWICE DAILY AS NEEDED FOR PAIN 60 tablet 4   • albuterol 108 (90 Base) MCG/ACT inhaler Inhale 2 puffs into the lungs every 4 hours as needed for Shortness of Breath or Wheezing. 1 Inhaler 5   • triamcinolone (ARISTOCORT) 0.1 % cream Apply topically 2 times daily as needed (rash/itching). 15 g 0   • Emollient (MOISTURIZING LOTION EX) Apply 1 application topically daily as needed.     • spironolactone (ALDACTONE) 50 MG tablet Take 2 tablets by mouth daily. 180 tablet 3   • Multiple Vitamins-Minerals (VITAMIN - THERAPEUTIC MULTIVITAMINS W/MINERALS) Tab Take 1 tablet by mouth daily. 30 tablet 0     No current facility-administered medications for this visit.      ALLERGIES:  ALLERGIES:   Allergen Reactions   • Perflutren Lipid Microsphere MYALGIA     Patient reported severe back and hip pain with Definity administration with 3 echocardiograms.        SOCIAL HISTORY:  Social History     Tobacco Use   • Smoking status: Current Every Day Smoker     Packs/day: 1.00     Years: 50.00     Pack years: 50.00     Types: Cigarettes   • Smokeless tobacco: Never Used   • Tobacco comment: Patient refused smoking cessation information.    Substance Use Topics   • Alcohol use: Yes     Alcohol/week: 2.0 standard drinks     Types: 2 Cans of beer per week     Frequency: Never     Drinks per session: 1 or 2     Binge frequency: Never     Comment: occasionally   • Drug use: Yes     Types: Marijuana     Comment:  occasionally       FAMILY HISTORY:  Family History   Problem Relation Age of Onset   • Cancer Father         esophagus   • Diabetes Paternal Grandmother       REVIEW OF SYSTEMS:  A 10 point ROS was done and is negative unless otherwise stated in the HPI. In addition, see ROS note.    PHYSICAL EXAMINATION:  Well developed male who is in no acute distress.   VITALS:   Visit Vitals  /69 (BP Location: LUE - Left upper extremity, Patient Position: Sitting, Cuff Size: Large Adult)   Pulse 96   Temp 97.8 °F (36.6 °C)   Resp 20   Ht 6' (1.829 m)   Wt (!) 157.5 kg   SpO2 100% Comment: ra   BMI 47.09 kg/m²      General: Cooperative, sitting comfortably, sitting in wheelchair.  HEAD: Normocephalic and atraumatic.   Neck:  Trachea mid line.   Respiratory: Bilateral air entry present. Clear to auscultation bilaterally but diminished. Respirations regular and unlabored. No wheezes, rales, rhonchi or crackles. Fair to good respiratory effort.   Cardiovascular: Regular rate and rhythm. Normal S1 and S2. 2-3/6 systolic murmur auscultated at the aortic valve area, no rubs, or gallops. No jugular venous distension. The carotid pulses are 2+ bilaterally. No carotid bruits auscultated.  Abdominal : Abdomen firm and distended. Normal  bowel sounds.    Extremities: No cyanosis, clubbing. 1+ edema bilateral lower extremities.  Lymphatic: No significant lymphadenopathy in submental, submandibular, or cervical chain.   Neurologic/psych : Alert and oriented x3. The patient is cooperative and pleasant. No gross sensory deficits noted. No apparent focal motor  deficits.   Skin/ integumentary : Warm and dry skin. No rashes.    LABS:   CHOLESTEROL (mg/dL)   Date Value   01/20/2020 144     Cholesterol (mg/dL)   Date Value   02/05/2020 144     TRIGLYCERIDE (mg/dL)   Date Value   01/20/2020 54     Triglycerides (mg/dL)   Date Value   02/05/2020 56     HDL (mg/dL)   Date Value   02/05/2020 63   01/20/2020 61     CALCULATED LDL (mg/dL)   Date  Value   2020 72     LDL (mg/dL)   Date Value   2020 70      Lab Results   Component Value Date    WBC 8.1 2020    WBC 6.1 07/10/2020    WBC 10.4 2019    WBC 13.2 (H) 09/10/2019    HGB 8.7 (L) 2020    HGB 7.8 (L) 07/10/2020    HGB 12.4 (L) 2019    HGB 12.9 (L) 09/10/2019    HCT 26.3 (L) 2020    HCT 23.0 (L) 07/10/2020    HCT 36.5 (L) 2019    HCT 37.3 (L) 09/10/2019     2020     (L) 07/10/2020     (L) 2020     2019     2014     10/14/2013    INR 1.2 07/10/2020    INR 1.3 2020    INR 1.2 2020    INR 1.1 2020    POTASSIUM 5.0 2020    POTASSIUM 4.3 07/10/2020    POTASSIUM 4.1 2020    POTASSIUM 4.1 2020    BUN 29 (H) 2020    BUN 35 (H) 07/10/2020    BUN 15 2020    BUN 12 2020    CREATININE 1.07 2020    CREATININE 1.20 (H) 07/10/2020    CREATININE 0.87 2020    CREATININE 0.78 2020     (H) 2018    BNP 44 2017    TSH 3.588 2020    TSH 5.247 (H) 2020     EC2020  Rate:            92                       P:            55   NC:             200                      QRS:          9   QRSD:         80                       T:            47   QT:             368                                       QTc:           455                                       Normal sinus rhythm       ASSESSMENT:  1. Paroxysmal atrial fibrillation  2. Aortic stenosis  3. Diastolic heart failure  4. Essential hypertension  5. Dyslipidemia  6. Obstructive sleep apnea  7. Obesity  8. Current tobacco use  9. Alcoholic cirrhosis    PLAN:  1. Paroxysmal atrial fibrillation: heart rate today 96 bpm. 12-lead EKG 2020: sinus rhythm. Last EKG showing atrial fibrillation was 2015. He is not anticoagulated because of increased bleeding risk with history of GI bleed. He denies palpitations, lightheadedness, pre-syncope or syncope.  Stable.   - not on antiarrhythmic, BB or CCB agent for heart rate control   - not on anticoagulation therapy due to risk of bleeding/history of GI bleed    2. Aortic stenosis: most recent 2-D echo 07/24/2018: moderate aortic valve stenosis, mean gradient 35 mmHg, ELENA 1.7 cm². ELENA index = 0.6 cm². He denies chest pain, shortness of breath or pre-syncope / syncope. He does note dyspnea which is felt to be due to ascites and COPD. He notes improvement in symptoms after paracentesis. Stable.    - he is not interested in follow-up echo as he does not believe there is anything wrong with his heart. I explained progression of AS with patient, but he continues to decline further work-up      3. Chronic diastolic heart failure, HFpEF: 2-D echo 07/24/2018: EF 70% with grade II diastolic dysfunction. Moderate aortic stenosis. He is symptomatic at this time due to ascites and will undergo paracentesis this Friday. He does minimally as most activity causes him to experience symptoms. It is difficult to assess NYHA class. No worsening lower extremity edema. Symptoms of shortness of breath improve after paracentesis therapy. Stable.   - continue torsemide; not on potassium supplement   - continue spironolactone   - compression stockings - unable to apply. Elevate legs   - exercise routine advised   - will enroll in HF clinic   - Teaching: Patient has been advised to record weights on a daily basis, call with a weight gain greater than 3 pounds in one day or 5 pounds in one week, follow a 2 gram sodium (2000mg)/2 liter (64 ounces) fluid restricted diet, avoid the use of NSAID including but not limited to Motrin, naprosyn Ibuprofen, Advil and Aleve. Tylenol is OK to take if agreed by PCP. We have explained that non-adherence to these instructions will result in progressive heart failure symptoms, frequent hospitalizations and shortened life span. Paulo verbalizes understanding of instructions.     4. Essential hypertension: blood  pressure today 125/69 mmHg. Controlled.   - not on any antihypertensives    5. Dyslipidemia: LDL 70 with other cholesterol levels controlled.    - not on statin   - counseled on diet    6. Obstructive sleep apnea: non-compliant with wearing CPAP nightly.   - advised to wear CPAP    7. Obesity: BMI 43.40 kg/m2. Adult class 3.  Overweight.   - advised diet and exercise for weight loss    8. Current tobacco use: continues to smoke tobacco. He was counseled on the effects of nicotine on the cardiovascular system and was advised to stop smoking. He declines to stop at this time. He also admits to smoking marijuana to assist with sleeping.    - advised to contact PCP when he is ready to quit smoking tobacco to discuss cessation options    9. Alcoholic cirrhosis: complicated with hepatic encephalopathy/ascites requiring weekly paracentesis with proven bleeding distal esophageal varices. Most recent EGD was notable for \"Portal hypertensive gastropathy and large clot in the gastric fundus without active bleeding. Esophagus with scarring from previous band ligation without active varices or bleeding stigmata\".  He continues to drink alcohol. He undergoes weekly paracentesis with removal of approximately 10 liters of peritoneal fluid during each procedure.    - sodium and fluid restriction   - continue weekly paracentesis, following with GI    Patient understands he/she can return sooner if any issues should arise.     FOLLOW-UP: 3 months with me, or sooner if needed.      RONNIE Swanson  [facility]   177.8

## 2020-08-10 LAB
CALCIUM SERPL-MCNC: 9.7 MG/DL
PARATHYROID HORMONE INTACT: 30 PG/ML
URATE SERPL-MCNC: 5.8 MG/DL

## 2020-08-11 ENCOUNTER — APPOINTMENT (OUTPATIENT)
Dept: UROLOGY | Facility: CLINIC | Age: 35
End: 2020-08-11

## 2020-08-12 ENCOUNTER — APPOINTMENT (OUTPATIENT)
Dept: UROLOGY | Facility: CLINIC | Age: 35
End: 2020-08-12

## 2020-08-18 ENCOUNTER — APPOINTMENT (OUTPATIENT)
Dept: ULTRASOUND IMAGING | Facility: CLINIC | Age: 35
End: 2020-08-18

## 2020-08-18 ENCOUNTER — APPOINTMENT (OUTPATIENT)
Dept: ULTRASOUND IMAGING | Facility: CLINIC | Age: 35
End: 2020-08-18
Payer: COMMERCIAL

## 2020-08-18 ENCOUNTER — OUTPATIENT (OUTPATIENT)
Dept: OUTPATIENT SERVICES | Facility: HOSPITAL | Age: 35
LOS: 1 days | End: 2020-08-18
Payer: COMMERCIAL

## 2020-08-18 DIAGNOSIS — N20.1 CALCULUS OF URETER: ICD-10-CM

## 2020-08-18 DIAGNOSIS — Z94.7 CORNEAL TRANSPLANT STATUS: Chronic | ICD-10-CM

## 2020-08-18 DIAGNOSIS — Z98.890 OTHER SPECIFIED POSTPROCEDURAL STATES: Chronic | ICD-10-CM

## 2020-08-18 PROCEDURE — 76770 US EXAM ABDO BACK WALL COMP: CPT | Mod: 26

## 2020-08-18 PROCEDURE — 76770 US EXAM ABDO BACK WALL COMP: CPT

## 2020-08-19 ENCOUNTER — APPOINTMENT (OUTPATIENT)
Dept: UROLOGY | Facility: CLINIC | Age: 35
End: 2020-08-19
Payer: COMMERCIAL

## 2020-08-19 DIAGNOSIS — N20.1 CALCULUS OF URETER: ICD-10-CM

## 2020-08-19 PROCEDURE — 99214 OFFICE O/P EST MOD 30 MIN: CPT

## 2020-08-27 PROBLEM — N20.1 RIGHT URETERAL STONE: Status: ACTIVE | Noted: 2020-05-07

## 2020-08-27 NOTE — ASSESSMENT
[FreeTextEntry1] : Ureteral stone:\par Disorder of calcium metabolism:\par Discussed Stone analysis.\par Recommended Kidney stone prevention diet:\par Good oral hydration so that urine is clear to light yellow, usually 1.5 to 2 Liters of fluids, mainly water\par Increasing Citrate in diet by consuming citrus fruits and juices- zenia, limes, oranges, grapefruits and berries \par Less red meat\par Less salt\par Limit foods with oxalate like- dark green vegetables, rhubarb, chocolate, wheat bran, nuts, cranberries, and beans\par Discussed kidney stone metabolic work up. \par 24 hr urine test was canceled. Repeat ordered. Will inform results. \par \par Return to office in 6 months or sooner if any issues.

## 2020-08-27 NOTE — PHYSICAL EXAM
[Normal Appearance] : normal appearance [General Appearance - In No Acute Distress] : no acute distress [Skin Color & Pigmentation] : normal skin color and pigmentation [] : no respiratory distress [Oriented To Time, Place, And Person] : oriented to person, place, and time [FreeTextEntry1] : normal peripheral circulation  [Normal Station and Gait] : the gait and station were normal for the patient's age

## 2020-08-27 NOTE — HISTORY OF PRESENT ILLNESS
[FreeTextEntry1] : 33 yo male presents for follow up. \par Had passed the stone.\par Denies any difficulty with urination. \par Denies dysuria, hematuria, lower abdominal or flank pain, fever, chills or rigors. \par \par Recently seen for right flank pain. 10/10, little relief with Tylenol ES. \par \par Did pass the left kidney stone, was not able to catch it. \par \par Initially seen for for kidney stone.\par Went to [facility] ED with left flank pain, vomiting and hematuria. \par Had CT scan was told has kidney stone and sent home on Pain meds. \par Did not see the stone pass. Pain had decreased but still has off and on pain. \par Denied any difficulty with urination. \par Denied dysuria, fever, chills or rigors. \par Has been to [facility] ED x 2 in Aug-Sept. Was told of kidney stone then too.\par No prior history of kidney stone, no family history.

## 2020-09-14 DIAGNOSIS — E83.50 UNSPECIFIED DISORDER OF CALCIUM METABOLISM: ICD-10-CM

## 2020-09-14 LAB
STONE COMMENTS: NORMAL
STONE, AMMONIUM URINE: 40 MEQ/DY
STONE, BRUSHITE: 4.74
STONE, CALCIUM OXALATE: 3.21
STONE, CALCIUM URINE: 426 MG/DAY
STONE, CITRATE URINE: 890 MG/DAY
STONE, CREATININE URINE: 1717 MG/DAY
STONE, MAGNESIUM URINE: 179 MG/DAY
STONE, OXALATE URINE: 35 MG/DAY
STONE, PH URINE: 6.2
STONE, PHOSPHOROUS URINE: 958 MG/DAY
STONE, POTASSIUM URINE: 49 MEQ/DY
STONE, SODIUM URATE: 2.32
STONE, SODIUM URINE: 140 MEQ/DY
STONE, STRUVITE: 3.02
STONE, SULFATE URINE: 12 MMOL/DAY
STONE, URIC ACID URINE: 0.96
STONE, URIC ACID URINE: 531 MG/DAY
SUPER SATURATION INDEX WITH RESPECT TO: NORMAL
SUSPECTED PROBLEM IS: NORMAL
THE PATIENT HAS: NORMAL
TOTAL VOLUME URINE: 1.54 CD:134269781

## 2020-12-17 NOTE — PATIENT PROFILE ADULT - MEDICATIONS/VISITS
From: Blanca Laws  To: Jeremiah Andre DO  Sent: 12/16/2020 5:33 PM CST  Subject: Other    This message is being sent by Juhi Landis on behalf of Blanca Laws.     Sorry to bother you again, but I should also say the the kid in her class also goes to the no

## 2021-01-04 ENCOUNTER — OUTPATIENT (OUTPATIENT)
Dept: OUTPATIENT SERVICES | Facility: HOSPITAL | Age: 36
LOS: 1 days | End: 2021-01-04
Payer: COMMERCIAL

## 2021-01-04 DIAGNOSIS — Z94.7 CORNEAL TRANSPLANT STATUS: Chronic | ICD-10-CM

## 2021-01-04 DIAGNOSIS — Z98.890 OTHER SPECIFIED POSTPROCEDURAL STATES: Chronic | ICD-10-CM

## 2021-01-04 DIAGNOSIS — Z20.828 CONTACT WITH AND (SUSPECTED) EXPOSURE TO OTHER VIRAL COMMUNICABLE DISEASES: ICD-10-CM

## 2021-01-04 LAB — SARS-COV-2 RNA SPEC QL NAA+PROBE: SIGNIFICANT CHANGE UP

## 2021-01-04 PROCEDURE — C9803: CPT

## 2021-01-04 PROCEDURE — U0005: CPT

## 2021-01-04 PROCEDURE — U0003: CPT

## 2021-01-19 NOTE — PRE-ANESTHESIA EVALUATION ADULT - ANESTHESIA, PREVIOUS REACTION, PROFILE
Detail Level: Detailed Doxycycline Pregnancy And Lactation Text: This medication is Pregnancy Category D and not consider safe during pregnancy. It is also excreted in breast milk but is considered safe for shorter treatment courses. none Isotretinoin Pregnancy And Lactation Text: This medication is Pregnancy Category X and is considered extremely dangerous during pregnancy. It is unknown if it is excreted in breast milk. Benzoyl Peroxide Counseling: Patient counseled that medicine may cause skin irritation and bleach clothing.  In the event of skin irritation, the patient was advised to reduce the amount of the drug applied or use it less frequently.   The patient verbalized understanding of the proper use and possible adverse effects of benzoyl peroxide.  All of the patient's questions and concerns were addressed. Birth Control Pills Pregnancy And Lactation Text: This medication should be avoided if pregnant and for the first 30 days post-partum. Topical Sulfur Applications Counseling: Topical Sulfur Counseling: Patient counseled that this medication may cause skin irritation or allergic reactions.  In the event of skin irritation, the patient was advised to reduce the amount of the drug applied or use it less frequently.   The patient verbalized understanding of the proper use and possible adverse effects of topical sulfur application.  All of the patient's questions and concerns were addressed. Spironolactone Counseling: Patient advised regarding risks of diarrhea, abdominal pain, hyperkalemia, birth defects (for female patients), liver toxicity and renal toxicity. The patient may need blood work to monitor liver and kidney function and potassium levels while on therapy. The patient verbalized understanding of the proper use and possible adverse effects of spironolactone.  All of the patient's questions and concerns were addressed. Tazorac Counseling:  Patient advised that medication is irritating and drying.  Patient may need to apply sparingly and wash off after an hour before eventually leaving it on overnight.  The patient verbalized understanding of the proper use and possible adverse effects of tazorac.  All of the patient's questions and concerns were addressed. Benzoyl Peroxide Pregnancy And Lactation Text: This medication is Pregnancy Category C. It is unknown if benzoyl peroxide is excreted in breast milk. Bactrim Counseling:  I discussed with the patient the risks of sulfa antibiotics including but not limited to GI upset, allergic reaction, drug rash, diarrhea, dizziness, photosensitivity, and yeast infections.  Rarely, more serious reactions can occur including but not limited to aplastic anemia, agranulocytosis, methemoglobinemia, blood dyscrasias, liver or kidney failure, lung infiltrates or desquamative/blistering drug rashes. Tazorac Pregnancy And Lactation Text: This medication is not safe during pregnancy. It is unknown if this medication is excreted in breast milk. Dapsone Counseling: I discussed with the patient the risks of dapsone including but not limited to hemolytic anemia, agranulocytosis, rashes, methemoglobinemia, kidney failure, peripheral neuropathy, headaches, GI upset, and liver toxicity.  Patients who start dapsone require monitoring including baseline LFTs and weekly CBCs for the first month, then every month thereafter.  The patient verbalized understanding of the proper use and possible adverse effects of dapsone.  All of the patient's questions and concerns were addressed. Erythromycin Counseling:  I discussed with the patient the risks of erythromycin including but not limited to GI upset, allergic reaction, drug rash, diarrhea, increase in liver enzymes, and yeast infections. Spironolactone Pregnancy And Lactation Text: This medication can cause feminization of the male fetus and should be avoided during pregnancy. The active metabolite is also found in breast milk. High Dose Vitamin A Counseling: Side effects reviewed, pt to contact office should one occur. Azithromycin Pregnancy And Lactation Text: This medication is considered safe during pregnancy and is also secreted in breast milk. Minocycline Pregnancy And Lactation Text: This medication is Pregnancy Category D and not consider safe during pregnancy. It is also excreted in breast milk. Topical Sulfur Applications Pregnancy And Lactation Text: This medication is Pregnancy Category C and has an unknown safety profile during pregnancy. It is unknown if this topical medication is excreted in breast milk. Dapsone Pregnancy And Lactation Text: This medication is Pregnancy Category C and is not considered safe during pregnancy or breast feeding. Topical Clindamycin Counseling: Patient counseled that this medication may cause skin irritation or allergic reactions.  In the event of skin irritation, the patient was advised to reduce the amount of the drug applied or use it less frequently.   The patient verbalized understanding of the proper use and possible adverse effects of clindamycin.  All of the patient's questions and concerns were addressed. Topical Retinoid counseling:  Patient advised to apply a pea-sized amount only at bedtime and wait 30 minutes after washing their face before applying.  If too drying, patient may add a non-comedogenic moisturizer. The patient verbalized understanding of the proper use and possible adverse effects of retinoids.  All of the patient's questions and concerns were addressed. High Dose Vitamin A Pregnancy And Lactation Text: High dose vitamin A therapy is contraindicated during pregnancy and breast feeding. Include Pregnancy/Lactation Warning?: No Erythromycin Pregnancy And Lactation Text: This medication is Pregnancy Category B and is considered safe during pregnancy. It is also excreted in breast milk. Tetracycline Counseling: Patient counseled regarding possible photosensitivity and increased risk for sunburn.  Patient instructed to avoid sunlight, if possible.  When exposed to sunlight, patients should wear protective clothing, sunglasses, and sunscreen.  The patient was instructed to call the office immediately if the following severe adverse effects occur:  hearing changes, easy bruising/bleeding, severe headache, or vision changes.  The patient verbalized understanding of the proper use and possible adverse effects of tetracycline.  All of the patient's questions and concerns were addressed. Patient understands to avoid pregnancy while on therapy due to potential birth defects. Sarecycline Counseling: Patient advised regarding possible photosensitivity and discoloration of the teeth, skin, lips, tongue and gums.  Patient instructed to avoid sunlight, if possible.  When exposed to sunlight, patients should wear protective clothing, sunglasses, and sunscreen.  The patient was instructed to call the office immediately if the following severe adverse effects occur:  hearing changes, easy bruising/bleeding, severe headache, or vision changes.  The patient verbalized understanding of the proper use and possible adverse effects of sarecycline.  All of the patient's questions and concerns were addressed. Azithromycin Counseling:  I discussed with the patient the risks of azithromycin including but not limited to GI upset, allergic reaction, drug rash, diarrhea, and yeast infections. Birth Control Pills Counseling: Birth Control Pill Counseling: I discussed with the patient the potential side effects of OCPs including but not limited to increased risk of stroke, heart attack, thrombophlebitis, deep venous thrombosis, hepatic adenomas, breast changes, GI upset, headaches, and depression.  The patient verbalized understanding of the proper use and possible adverse effects of OCPs. All of the patient's questions and concerns were addressed. Topical Retinoid Pregnancy And Lactation Text: This medication is Pregnancy Category C. It is unknown if this medication is excreted in breast milk. Doxycycline Counseling:  Patient counseled regarding possible photosensitivity and increased risk for sunburn.  Patient instructed to avoid sunlight, if possible.  When exposed to sunlight, patients should wear protective clothing, sunglasses, and sunscreen.  The patient was instructed to call the office immediately if the following severe adverse effects occur:  hearing changes, easy bruising/bleeding, severe headache, or vision changes.  The patient verbalized understanding of the proper use and possible adverse effects of doxycycline.  All of the patient's questions and concerns were addressed. Isotretinoin Counseling: Patient should get monthly blood tests, not donate blood, not drive at night if vision affected, not share medication, and not undergo elective surgery for 6 months after tx completed. Side effects reviewed, pt to contact office should one occur. Minocycline Counseling: Patient advised regarding possible photosensitivity and discoloration of the teeth, skin, lips, tongue and gums.  Patient instructed to avoid sunlight, if possible.  When exposed to sunlight, patients should wear protective clothing, sunglasses, and sunscreen.  The patient was instructed to call the office immediately if the following severe adverse effects occur:  hearing changes, easy bruising/bleeding, severe headache, or vision changes.  The patient verbalized understanding of the proper use and possible adverse effects of minocycline.  All of the patient's questions and concerns were addressed. Detail Level: Zone Topical Clindamycin Pregnancy And Lactation Text: This medication is Pregnancy Category B and is considered safe during pregnancy. It is unknown if it is excreted in breast milk. Bactrim Pregnancy And Lactation Text: This medication is Pregnancy Category D and is known to cause fetal risk.  It is also excreted in breast milk.

## 2021-02-17 ENCOUNTER — APPOINTMENT (OUTPATIENT)
Dept: UROLOGY | Facility: CLINIC | Age: 36
End: 2021-02-17

## 2021-12-06 NOTE — H&P PST ADULT - MUSCULOSKELETAL
There are no Wet Read(s) to document. No joint pain, swelling or deformity; no limitation of movement negative

## 2021-12-11 ENCOUNTER — TRANSCRIPTION ENCOUNTER (OUTPATIENT)
Age: 36
End: 2021-12-11

## 2022-04-29 NOTE — ASU PREOP CHECKLIST - SIDE RAILS UP
Preceptor Attestation:   Patient seen, evaluated and discussed with the resident Dr. Ireland. I have verified the content of the note, which accurately reflects my assessment of the patient and the plan of care.    No contraindications to procedure.    Supervising Physician:Benjamin Rosenstein, MD, MA [facility]             done

## 2022-06-05 ENCOUNTER — NON-APPOINTMENT (OUTPATIENT)
Age: 37
End: 2022-06-05

## 2022-08-11 ENCOUNTER — NON-APPOINTMENT (OUTPATIENT)
Age: 37
End: 2022-08-11

## 2023-04-08 ENCOUNTER — NON-APPOINTMENT (OUTPATIENT)
Age: 38
End: 2023-04-08

## 2023-12-28 ENCOUNTER — NON-APPOINTMENT (OUTPATIENT)
Age: 38
End: 2023-12-28

## 2024-06-29 ENCOUNTER — NON-APPOINTMENT (OUTPATIENT)
Age: 39
End: 2024-06-29

## 2024-12-29 ENCOUNTER — NON-APPOINTMENT (OUTPATIENT)
Age: 39
End: 2024-12-29

## 2025-09-13 ENCOUNTER — NON-APPOINTMENT (OUTPATIENT)
Age: 40
End: 2025-09-13